# Patient Record
Sex: FEMALE | Race: WHITE
[De-identification: names, ages, dates, MRNs, and addresses within clinical notes are randomized per-mention and may not be internally consistent; named-entity substitution may affect disease eponyms.]

---
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
## 2021-02-08 ENCOUNTER — HOSPITAL ENCOUNTER (OUTPATIENT)
Dept: HOSPITAL 47 - FBPOP | Age: 21
Discharge: HOME | End: 2021-02-08
Attending: OBSTETRICS & GYNECOLOGY
Payer: COMMERCIAL

## 2021-02-08 VITALS
HEART RATE: 89 BPM | TEMPERATURE: 97.4 F | SYSTOLIC BLOOD PRESSURE: 118 MMHG | DIASTOLIC BLOOD PRESSURE: 73 MMHG | RESPIRATION RATE: 17 BRPM

## 2021-02-08 DIAGNOSIS — O46.93: Primary | ICD-10-CM

## 2021-02-08 DIAGNOSIS — Z3A.34: ICD-10-CM

## 2021-02-08 PROCEDURE — 99213 OFFICE O/P EST LOW 20 MIN: CPT

## 2021-02-08 PROCEDURE — 59025 FETAL NON-STRESS TEST: CPT

## 2021-02-16 NOTE — P.MSEPDOC
Presenting Problems





- Arrival Data


Date of Arrival on Unit: 21


Time of Arrival on Unit: 14:55


Mode of Transport: Ambulatory





- Complaint


OB-Reason for Admission/Chief Complaint: Other


Comment: pt here with c/o bright red spotting and lower abd cramping that 

started around 1100 today after bearing down for bowel movment, pt denies 

intercourse, sterile spec performed with no vaginal bleeding noted or external 

hemorrhoids observed





Prenatal Medical History





- Pregnancy Information


: 1


Para: 0


Term: 0


: 0


Abortions: Spontaneous or Elective: 0


Number of Living Children: 0





- Gestational Age


Gestational Age by JESSICA (wks/days): 34 Weeks and 5 Days





- Prenatal History


Pregnancy Complications: Smoker





Review of Systems





- Review of Systems


Constitutional: No problems


Breast: No problems


ENT: No problems


Cardiovascular: No problems


Respiratory: No problems


Gastrointestinal: No problems


Genitourinary: No problems


Musculoskeletal: No problems


Neurological: No problems


Skin: No problems





Vital Signs





- Temperature


Temperature: 97.4 F


Temperature Source: Oral





- Pulse


  ** Right Brachial


Pulse Rate: 89


Pulse Assessment Method: Automatic Cuff





- Respirations


Respiratory Rate: 17


Oxygen Delivery Method: Room Air


O2 Sat by Pulse Oximetry: 98





- Blood Pressure


  ** Right Arm


Blood Pressure: 118/73


Blood Pressure Mean: 88


Blood Pressure Source: Automatic Cuff





Medical Screen Scoring (Pre)





- Cervical Exam


Dilation: 0 cm = 0


Membranes: Intact





- Uterine Contractions


Frequency: > 5 minutes apart = 1


Duration: > 40 seconds = 2


Intensity: N/A





- Maternal Vital Signs


Maternal Temperature: N/A


Maternal Blood Pressure: N/A


Signs of Preeclampsia: N/A


Maternal Respirations: N/A





- Maternal Trauma


Maternal Trauma: N/A





- Fetal Assessment - Baby A


Baseline FHR: 130


Fetal Heart Rate - NICHD Category: Category I (Normal) = 0


NST: Reactive


Fetal Position: N/A


Fetal Station: N/A





- Total Score - Baby A


Total Score - Baby A: 3





- Total Score - Baby B


Total Score - Baby B: 3





- Total Score - Baby C


Total Score - Baby C: 3





- Level of Risk - Baby A


Level of Risk - Baby A: Low (0-5)





- Level of Risk - Baby B


Level of Risk - Baby B: Low (0-5)





- Level of Risk - Baby C


Level of Risk - Baby C: Low (0-5)





Physician Notification (Pre)





- Physician Notified


Physician Notified Date: 21


Physician Notified Time: 15:31


New Order Received: Yes (dc home)





- Notification Comment


Comment: pt ok to dc home, pt to be on pelvic rest and must follow up with dr kennedy tomorrow to be seen in the office this week





Disposition





- Disposition


OB Disposition: Discharge to home, Written follow up instructions reviewed


Discharge Date: 21


Discharge Time: 15:45


I agree with the RN Medical Screening Exam: Yes


Case reviewed; plan agreed upon as documented in EMR&OBIX.: Yes


Diagnosis: vaginal bleeding in pregnancy

## 2021-02-22 ENCOUNTER — HOSPITAL ENCOUNTER (INPATIENT)
Dept: HOSPITAL 47 - 4FBP | Age: 21
LOS: 1 days | Discharge: HOME | End: 2021-02-23
Attending: OBSTETRICS & GYNECOLOGY | Admitting: OBSTETRICS & GYNECOLOGY
Payer: COMMERCIAL

## 2021-02-22 DIAGNOSIS — L50.9: ICD-10-CM

## 2021-02-22 DIAGNOSIS — F17.290: ICD-10-CM

## 2021-02-22 DIAGNOSIS — F31.9: ICD-10-CM

## 2021-02-22 DIAGNOSIS — F60.3: ICD-10-CM

## 2021-02-22 DIAGNOSIS — F41.9: ICD-10-CM

## 2021-02-22 DIAGNOSIS — Z3A.36: ICD-10-CM

## 2021-02-22 DIAGNOSIS — K83.1: ICD-10-CM

## 2021-02-22 LAB
ALT SERPL-CCNC: 589 U/L (ref 4–34)
AST SERPL-CCNC: 480 U/L (ref 14–36)
BASOPHILS # BLD AUTO: 0 K/UL (ref 0–0.2)
BASOPHILS NFR BLD AUTO: 0 %
EOSINOPHIL # BLD AUTO: 0.1 K/UL (ref 0–0.7)
EOSINOPHIL NFR BLD AUTO: 1 %
ERYTHROCYTE [DISTWIDTH] IN BLOOD BY AUTOMATED COUNT: 4.18 M/UL (ref 3.8–5.4)
ERYTHROCYTE [DISTWIDTH] IN BLOOD: 13.7 % (ref 11.5–15.5)
HCT VFR BLD AUTO: 33.1 % (ref 34–46)
HGB BLD-MCNC: 11.1 GM/DL (ref 11.4–16)
LDH SPEC-CCNC: 666 U/L (ref 313–618)
LYMPHOCYTES # SPEC AUTO: 1.5 K/UL (ref 1–4.8)
LYMPHOCYTES NFR SPEC AUTO: 23 %
MCH RBC QN AUTO: 26.5 PG (ref 25–35)
MCHC RBC AUTO-ENTMCNC: 33.5 G/DL (ref 31–37)
MCV RBC AUTO: 79.2 FL (ref 80–100)
MONOCYTES # BLD AUTO: 0.5 K/UL (ref 0–1)
MONOCYTES NFR BLD AUTO: 7 %
NEUTROPHILS # BLD AUTO: 4.4 K/UL (ref 1.3–7.7)
NEUTROPHILS NFR BLD AUTO: 67 %
PLATELET # BLD AUTO: 177 K/UL (ref 150–450)
WBC # BLD AUTO: 6.5 K/UL (ref 4–11)

## 2021-02-22 PROCEDURE — 85027 COMPLETE CBC AUTOMATED: CPT

## 2021-02-22 PROCEDURE — 0HQ9XZZ REPAIR PERINEUM SKIN, EXTERNAL APPROACH: ICD-10-PCS

## 2021-02-22 PROCEDURE — 86901 BLOOD TYPING SEROLOGIC RH(D): CPT

## 2021-02-22 PROCEDURE — 85025 COMPLETE CBC W/AUTO DIFF WBC: CPT

## 2021-02-22 PROCEDURE — 84460 ALANINE AMINO (ALT) (SGPT): CPT

## 2021-02-22 PROCEDURE — 10907ZC DRAINAGE OF AMNIOTIC FLUID, THERAPEUTIC FROM PRODUCTS OF CONCEPTION, VIA NATURAL OR ARTIFICIAL OPENING: ICD-10-PCS

## 2021-02-22 PROCEDURE — 3E0R3NZ INTRODUCTION OF ANALGESICS, HYPNOTICS, SEDATIVES INTO SPINAL CANAL, PERCUTANEOUS APPROACH: ICD-10-PCS

## 2021-02-22 PROCEDURE — 86900 BLOOD TYPING SEROLOGIC ABO: CPT

## 2021-02-22 PROCEDURE — 84450 TRANSFERASE (AST) (SGOT): CPT

## 2021-02-22 PROCEDURE — 83615 LACTATE (LD) (LDH) ENZYME: CPT

## 2021-02-22 PROCEDURE — 86850 RBC ANTIBODY SCREEN: CPT

## 2021-02-22 PROCEDURE — 3E033VJ INTRODUCTION OF OTHER HORMONE INTO PERIPHERAL VEIN, PERCUTANEOUS APPROACH: ICD-10-PCS

## 2021-02-22 PROCEDURE — 00HU33Z INSERTION OF INFUSION DEVICE INTO SPINAL CANAL, PERCUTANEOUS APPROACH: ICD-10-PCS

## 2021-02-22 PROCEDURE — 88307 TISSUE EXAM BY PATHOLOGIST: CPT

## 2021-02-22 RX ADMIN — POTASSIUM CHLORIDE SCH MLS/HR: 14.9 INJECTION, SOLUTION INTRAVENOUS at 22:24

## 2021-02-22 RX ADMIN — POTASSIUM CHLORIDE SCH MLS/HR: 14.9 INJECTION, SOLUTION INTRAVENOUS at 14:10

## 2021-02-22 RX ADMIN — POTASSIUM CHLORIDE SCH MLS/HR: 14.9 INJECTION, SOLUTION INTRAVENOUS at 17:01

## 2021-02-22 NOTE — P.PROBDLV
Vaginal Delivery Note





- .


Vaginal Delivery Note: 


This is a 20 yo  at 36 5/7 weeks that presents for Induction of labor 

secondary to cholestasis of pregnancy.  patient c/o generalized itching, bile 

acids along with LFTs were drawn and noted to be significantly elevated.  she 

was admitted to labor and delivery,  and pitocin induction of labor was begun 

per hospital protocol.  she underwent amniotomy and clear fluid was obtained.  

she progressed in labor and became uncomfortable.  she requested epidural 

placement,  epidural was placed by anesthesia.  she progressed to complete and 

began pushing, with excellent maternal effort she had a normal spontaneous 

vaginal delivery of a viable female infant at 20:59, weight of 7-9, apgars of 9-

9 at one and five minutes respectively.  After a 2 minute delay the umbilical 

cord was clamped and cut.  the infant was placed on the maternal abdomen.  The 

placenta was delivered spontaneous intact with a 3 vessel cord being noted.    

On inspection of the vaginal vault b/l lacerations are noted.  a small figure of

8 was preformed on the right was done to obtain hemostasis.  


uterus was firm and below the umbilicus,  EBL 200cc.  


all counts noted to be correct x 2. 


Patient and infant tolerated delivery well.

## 2021-02-22 NOTE — P.HPOB
History of Present Illness


H&P Date: 21


Chief Complaint: IUP at 36-5/7 weeks, cholestasis of pregnancy





Sustain a 21-year-old  at 36-5/7 weeks that presents to labor and delivery 

after routine OB visit.  Patient was seen previously complaining of generalized 

urticaria, liver function tests and bile acids were drawn.  Liver function tests

were resulted in significantly elevated along with an elevated bile acid of 20. 

Patient notes good fetal movement.  Otherwise patient's prenatal care has been 

uncomplicated.  Patient is a history of a normal spontaneous vaginal delivery 

around 38 weeks.


Patient denies loss of fluid or vaginal voiding.  She does note good fetal 

movement and occasional contractions.








Review of Systems


Constitutional: Denies chills, Denies fatigue, Denies fever


Ears, nose, mouth and throat: Denies headache


Cardiovascular: Reports leg edema


Respiratory: Denies dyspnea


Gastrointestinal: Denies constipation, Denies diarrhea, Denies nausea, Denies 

vomiting


Genitourinary: Reports pregnant


Integumentary: Reports as per HPI





Past Medical History


Past Medical History: No Reported History


Additional Past Medical History / Comment(s): borderline personality disorder


History of Any Multi-Drug Resistant Organisms: None Reported


Past Surgical History: Tonsillectomy


Additional Past Surgical History / Comment(s): thyroid nodule removed, ganglion 

cyst removed


Past Anesthesia/Blood Transfusion Reactions: No Reported Reaction


Past Psychological History: Anxiety, Bipolar, Depression


Additional Psychological History / Comment(s): borderline personality disorder


Smoking Status: Vaper


Past Alcohol Use History: None Reported


Additional Past Alcohol Use History / Comment(s): social smoker


Past Drug Use History: Marijuana


Additional Drug Use History / Comment(s): rare





- Past Family History


  ** Mother


Family Medical History: No Reported History





Medications and Allergies


                                Home Medications











 Medication  Instructions  Recorded  Confirmed  Type


 


No Known Home Medications  10/18/19 02/22/21 History








                                    Allergies











Allergy/AdvReac Type Severity Reaction Status Date / Time


 


No Known Allergies Allergy   Verified 21 13:58














Exam


Osteopathic Statement: *.  No significant issues noted on an osteopathic 

structural exam other than those noted in the History and Physical/Consult.


                                   Vital Signs











  Temp Pulse Resp Pulse Ox


 


 21 14:40  96.2 F L  95  18  98








                                Intake and Output











 21





 06:59 14:59 22:59


 


Other:   


 


  # Voids  1 


 


  Weight  102.058 kg 














Targeted physical exam is performed in this date and generalist a well-nourished

well-developed pregnant female in no acute distress, breathing is noted to be 

nonlabored, heart has regular rhythm, abdomen is gravid and appropriate for 

gestational age and cervical exam she is 2/50/-3 station amniotomy is performed 

and clear fluid was obtained.  Category 1 heart tones are appreciated with 

irregular contractions.





Results


Result Diagrams: 


                                 21 14:36





                  Abnormal Lab Results - Last 24 Hours (Table)











  21 Range/Units





  14:36 14:36 


 


Hgb  11.1 L   (11.4-16.0)  gm/dL


 


Hct  33.1 L   (34.0-46.0)  %


 


MCV  79.2 L   (80.0-100.0)  fL


 


AST   480 H  (14-36)  U/L


 


ALT   589 H  (4-34)  U/L


 


Lactate Dehydrogenase   666 H  (313-618)  U/L














Assessment and Plan


(1) 36 to 37 weeks gestation of pregnancy


Current Visit: Yes   Status: Acute   Code(s): VTK3280 -    SNOMED Code(s): 

533578050


   





(2) Cholestasis during pregnancy


Current Visit: Yes   Status: Acute   Code(s): O26.619 - LIVER AND BILIARY TRACT 

DISORD IN PREGNANCY, UNSP TRIMESTER; K83.1 - OBSTRUCTION OF BILE DUCT   SNOMED 

Code(s): 173401376


   


Plan: 





This 21-year-old  at 36-5/7 weeks was admitted to labor and delivery for 

induction of labor secondary to cholestasis.  Liver function tests are ordered 

in addition to routine admission labs.  Patient is offered epidural once 

uncomfortable.  Pitocin is begun per hospital protocol.  Anticipate spontaneous 

vaginal delivery.

## 2021-02-23 VITALS
TEMPERATURE: 98.4 F | RESPIRATION RATE: 16 BRPM | DIASTOLIC BLOOD PRESSURE: 73 MMHG | HEART RATE: 72 BPM | SYSTOLIC BLOOD PRESSURE: 127 MMHG

## 2021-02-23 LAB
ALT SERPL-CCNC: 475 U/L (ref 4–34)
AST SERPL-CCNC: 349 U/L (ref 14–36)
ERYTHROCYTE [DISTWIDTH] IN BLOOD BY AUTOMATED COUNT: 3.88 M/UL (ref 3.8–5.4)
ERYTHROCYTE [DISTWIDTH] IN BLOOD: 14 % (ref 11.5–15.5)
HCT VFR BLD AUTO: 31 % (ref 34–46)
HGB BLD-MCNC: 10.5 GM/DL (ref 11.4–16)
MCH RBC QN AUTO: 27 PG (ref 25–35)
MCHC RBC AUTO-ENTMCNC: 33.8 G/DL (ref 31–37)
MCV RBC AUTO: 79.7 FL (ref 80–100)
PLATELET # BLD AUTO: 146 K/UL (ref 150–450)
WBC # BLD AUTO: 11.5 K/UL (ref 4–11)

## 2021-02-23 RX ADMIN — IBUPROFEN PRN MG: 600 TABLET ORAL at 16:24

## 2021-02-23 RX ADMIN — DOCUSATE SODIUM AND SENNOSIDES SCH EACH: 50; 8.6 TABLET ORAL at 19:23

## 2021-02-23 RX ADMIN — IBUPROFEN PRN MG: 600 TABLET ORAL at 04:01

## 2021-02-23 RX ADMIN — DOCUSATE SODIUM AND SENNOSIDES SCH EACH: 50; 8.6 TABLET ORAL at 08:49

## 2021-02-23 NOTE — P.DS
Providers


Date of admission: 


02/22/21 13:36





Expected date of discharge: 02/23/21


Attending physician: 


Georgia Monet





Primary care physician: 


Georgia Monet








- Discharge Diagnosis(es)


(1) 36 to 37 weeks gestation of pregnancy


Current Visit: Yes   Status: Acute   





(2) Cholestasis during pregnancy


Current Visit: Yes   Status: Acute   





(3) Status post vaginal delivery


Current Visit: Yes   Status: Acute   


Hospital Course: 





prenatal visit.  She had had labs done at the prior visit for complaints of 

urticaria.  On review of the labs liver function tests were noted to be 

significantly elevated along with bilateral since being elevated at 20.  Patient

was then admitted to labor and delivery for induction of labor secondary to 

cholestasis of pregnancy.  Patient was admitted and Pitocin induction of labor 

was begun.  Amniotomy was performed and clear fluid was obtained.  Patient did 

become uncomfortable and requested epidural placement.  Epidural was placed 

without difficulty by the anesthesia department.  She did get minimal relief of 

her contractions from the epidural.  Therefore epidural was replaced by 

anesthesia where once again she did get relief but was moving along in labor and

noted to be complete soon afterwards.  Patient began pushing and had a normal 

spontaneous vaginal delivery of a viable female infant at 2089, weight of 7 lbs.

9 oz. with Apgars of 99 at one and 5 minutes respectively.  Patient did sustain 

a small.  Urethral/labial laceration which was repaired with a figure-of-eight 

suture of 0 Vicryl. 


Patient did tolerate delivery well, she is breast-feeding.  Her postpartum 

course has been uneventful.  On this postpartum day #1 she is in bleeding and 

voiding without difficulty.  She is tolerating a regular diet without nausea or 

vomiting.  She states her pain is well-controlled.  She did have repeat liver 

function test done this morning, AST went from 480 to , ALT decreased from 589 

to 475, hemoglobin stable going from 11-10.5.  


she does look well this morning and would like discharge home.


Patient Condition at Discharge: Good





Plan - Discharge Summary


New Discharge Prescriptions: 


No Action


   No Known Home Medications 


Discharge Medication List





No Known Home Medications  10/18/19 [History]








Follow up Appointment(s)/Referral(s): 


Georgia Monet DO [Primary Care Provider] - 1 Week


Patient Instructions/Handouts:  Vaginal Delivery (DC), Vaginal Delivery (GEN)


Activity/Diet/Wound Care/Special Instructions: 


patient is to follow-up in 1 week for repeat liver function tests.  Patient is 

to call the office should any thing arise that is concerning to her prior to 

this appointment.  She is to call with fever, chills, abnormal vaginal bleeding,

increase in urticaria symptoms.  Over-the-counter ibuprofen as needed fo pain.


Discharge Disposition: HOME SELF-CARE

## 2021-04-06 ENCOUNTER — HOSPITAL ENCOUNTER (EMERGENCY)
Dept: HOSPITAL 47 - EC | Age: 21
Discharge: HOME | End: 2021-04-06
Payer: COMMERCIAL

## 2021-04-06 VITALS
TEMPERATURE: 98.7 F | HEART RATE: 92 BPM | DIASTOLIC BLOOD PRESSURE: 67 MMHG | SYSTOLIC BLOOD PRESSURE: 114 MMHG | RESPIRATION RATE: 16 BRPM

## 2021-04-06 DIAGNOSIS — Z00.00: Primary | ICD-10-CM

## 2021-04-06 DIAGNOSIS — F17.290: ICD-10-CM

## 2021-04-06 DIAGNOSIS — Z20.822: ICD-10-CM

## 2021-04-06 PROCEDURE — 99284 EMERGENCY DEPT VISIT MOD MDM: CPT

## 2021-04-06 PROCEDURE — 87635 SARS-COV-2 COVID-19 AMP PRB: CPT

## 2021-04-06 NOTE — ED
URI HPI





- General


Chief Complaint: Headache


Stated Complaint: covid test


Time Seen by Provider: 04/06/21 03:47


Source: patient


Mode of arrival: ambulatory


Limitations: no limitations





- Related Data


                                Home Medications











 Medication  Instructions  Recorded  Confirmed


 


No Known Home Medications  10/18/19 02/22/21











                                    Allergies











Allergy/AdvReac Type Severity Reaction Status Date / Time


 


No Known Allergies Allergy   Verified 04/06/21 00:19














Review of Systems


ROS Statement: 


Those systems with pertinent positive or pertinent negative responses have been 

documented in the HPI.





ROS Other: All systems not noted in ROS Statement are negative.





Past Medical History


Past Medical History: No Reported History


Additional Past Medical History / Comment(s): borderline personality disorder


History of Any Multi-Drug Resistant Organisms: None Reported


Past Surgical History: Tonsillectomy


Additional Past Surgical History / Comment(s): thyroid nodule removed, ganglion 

cyst removed


Past Anesthesia/Blood Transfusion Reactions: No Reported Reaction


Past Psychological History: Anxiety, Bipolar, Depression


Smoking Status: Vaper


Past Alcohol Use History: None Reported


Past Drug Use History: Marijuana





- Past Family History


  ** Mother


Family Medical History: No Reported History





General Exam


Limitations: no limitations





Course





                                   Vital Signs











  04/06/21





  00:19


 


Temperature 98.7 F


 


Pulse Rate 92


 


Respiratory 16





Rate 


 


Blood Pressure 114/67


 


O2 Sat by Pulse 98





Oximetry 














Medical Decision Making





- Lab Data





                                   Lab Results











  04/06/21 Range/Units





  00:26 


 


Coronavirus (PCR)  Not Detected  (Not Detectd)  














Disposition


Clinical Impression: 


 Normal exam





Narrative: 





COVID exposure


Disposition: HOME SELF-CARE


Condition: Good


Instructions (If sedation given, give patient instructions):  Normal Exam (ED)


Is patient prescribed a controlled substance at d/c from ED?: No


Referrals: 


None,Stated [Primary Care Provider] - 1-2 days

## 2021-05-07 ENCOUNTER — HOSPITAL ENCOUNTER (EMERGENCY)
Dept: HOSPITAL 47 - EC | Age: 21
Discharge: HOME | End: 2021-05-07
Payer: COMMERCIAL

## 2021-05-07 VITALS
SYSTOLIC BLOOD PRESSURE: 118 MMHG | RESPIRATION RATE: 18 BRPM | DIASTOLIC BLOOD PRESSURE: 68 MMHG | TEMPERATURE: 98 F | HEART RATE: 67 BPM

## 2021-05-07 DIAGNOSIS — F17.290: ICD-10-CM

## 2021-05-07 DIAGNOSIS — F41.9: ICD-10-CM

## 2021-05-07 DIAGNOSIS — R51.9: ICD-10-CM

## 2021-05-07 DIAGNOSIS — F32.9: ICD-10-CM

## 2021-05-07 DIAGNOSIS — R11.0: Primary | ICD-10-CM

## 2021-05-07 DIAGNOSIS — F12.90: ICD-10-CM

## 2021-05-07 DIAGNOSIS — Z20.822: ICD-10-CM

## 2021-05-07 PROCEDURE — 87635 SARS-COV-2 COVID-19 AMP PRB: CPT

## 2021-05-07 PROCEDURE — 99283 EMERGENCY DEPT VISIT LOW MDM: CPT

## 2021-05-07 NOTE — ED
General Adult HPI





- General


Chief complaint: Abdominal Pain


Stated complaint: Covid test


Time Seen by Provider: 05/07/21 18:21


Source: patient, RN notes reviewed


Mode of arrival: ambulatory


Limitations: no limitations





- History of Present Illness


Initial comments: 





21-year-old female presents to the emergency room for a chief complaint of 

wanting a corona virus test.  Patient reports her child tested positive today 

and she would like to be tested.  States she has had slight nausea and headaches

but otherwise denies any symptoms.  Denies fevers.  Denies shortness of breath 

or chest pain.  Denies abdominal pain.Patient has no other complaints at this 

time including shortness of breath, chest pain, abdominal pain, nausea or 

vomiting, headache, or visual changes.





- Related Data


                                Home Medications











 Medication  Instructions  Recorded  Confirmed


 


No Known Home Medications  10/18/19 02/22/21











                                    Allergies











Allergy/AdvReac Type Severity Reaction Status Date / Time


 


No Known Allergies Allergy   Verified 04/06/21 00:19














Review of Systems


ROS Statement: 


Those systems with pertinent positive or pertinent negative responses have been 

documented in the HPI.





ROS Other: All systems not noted in ROS Statement are negative.





Past Medical History


Past Medical History: No Reported History


Additional Past Medical History / Comment(s): borderline personality disorder


History of Any Multi-Drug Resistant Organisms: None Reported


Past Surgical History: Tonsillectomy


Additional Past Surgical History / Comment(s): thyroid nodule removed, ganglion 

cyst removed


Past Anesthesia/Blood Transfusion Reactions: No Reported Reaction


Past Psychological History: Anxiety, Bipolar, Depression


Smoking Status: Vaper


Past Alcohol Use History: None Reported


Past Drug Use History: Marijuana





- Past Family History


  ** Mother


Family Medical History: No Reported History





General Exam


Limitations: no limitations


General appearance: alert, in no apparent distress


Head exam: Present: atraumatic, normocephalic, normal inspection


Eye exam: Present: normal appearance, PERRL, EOMI.  Absent: scleral icterus, 

conjunctival injection, periorbital swelling


ENT exam: Present: normal exam, mucous membranes moist


Neck exam: Present: normal inspection.  Absent: tenderness, meningismus, 

lymphadenopathy


Respiratory exam: Present: normal lung sounds bilaterally.  Absent: respiratory 

distress, wheezes, rales, rhonchi, stridor


Cardiovascular Exam: Present: regular rate, normal rhythm, normal heart sounds. 

Absent: systolic murmur, diastolic murmur, rubs, gallop, clicks


GI/Abdominal exam: Present: soft, normal bowel sounds.  Absent: distended, 

tenderness, guarding, rebound, rigid


Neurological exam: Present: alert





Course


                                   Vital Signs











  05/07/21





  18:16


 


Temperature 97.8 F


 


Pulse Rate 86


 


Respiratory 16





Rate 


 


Blood Pressure 119/71


 


O2 Sat by Pulse 98





Oximetry 














Medical Decision Making





- Medical Decision Making





Vitals are stable.  Patient is well-appearing.  Coronavirus is negative.  

However patient has been exposed and therefore should quarantine the 10-14 days.

 I also recommend she repeat her tested the next few days.  If she has any 

worsening symptoms she will return to the emergency room.





- Lab Data


                                   Lab Results











  05/07/21 Range/Units





  18:25 


 


Coronavirus (PCR)  Not Detected  (Not Detectd)  














Disposition


Clinical Impression: 


 Lab test negative for COVID-19 virus





Disposition: HOME SELF-CARE


Condition: Good


Instructions (If sedation given, give patient instructions):  Coronavirus 

Disease 2019 (COVID-19)


Additional Instructions: 


You tested negative for COVID however you have been exposed to COVID so you must

quarantine for 10-14 days.  We recommend that you repeat a COVID test in the 

next few days as well.  If you develop any shortness of breath return to the 

emergency room.  Otherwise take Tylenol for fever and over-the-counter vitamins.


Is patient prescribed a controlled substance at d/c from ED?: No


Referrals: 


ELVIE Rodriguez III, MD [Primary Care Provider] - 1-2 days


Time of Disposition: 19:25

## 2021-05-20 ENCOUNTER — HOSPITAL ENCOUNTER (INPATIENT)
Dept: HOSPITAL 47 - EC | Age: 21
LOS: 2 days | Discharge: PSYCHIATRIC OR SUBSTANCE USE FACILITY | DRG: 918 | End: 2021-05-22
Attending: INTERNAL MEDICINE | Admitting: INTERNAL MEDICINE
Payer: COMMERCIAL

## 2021-05-20 VITALS — BODY MASS INDEX: 30.7 KG/M2

## 2021-05-20 DIAGNOSIS — F15.10: ICD-10-CM

## 2021-05-20 DIAGNOSIS — H57.04: ICD-10-CM

## 2021-05-20 DIAGNOSIS — F41.9: ICD-10-CM

## 2021-05-20 DIAGNOSIS — F31.9: ICD-10-CM

## 2021-05-20 DIAGNOSIS — D72.829: ICD-10-CM

## 2021-05-20 DIAGNOSIS — F17.200: ICD-10-CM

## 2021-05-20 DIAGNOSIS — F60.3: ICD-10-CM

## 2021-05-20 DIAGNOSIS — E87.2: ICD-10-CM

## 2021-05-20 DIAGNOSIS — T44.3X1A: ICD-10-CM

## 2021-05-20 DIAGNOSIS — E86.0: ICD-10-CM

## 2021-05-20 DIAGNOSIS — F14.10: ICD-10-CM

## 2021-05-20 DIAGNOSIS — T43.621A: Primary | ICD-10-CM

## 2021-05-20 DIAGNOSIS — Z20.822: ICD-10-CM

## 2021-05-20 DIAGNOSIS — R45.851: ICD-10-CM

## 2021-05-20 DIAGNOSIS — Z91.5: ICD-10-CM

## 2021-05-20 DIAGNOSIS — F12.10: ICD-10-CM

## 2021-05-20 DIAGNOSIS — N17.9: ICD-10-CM

## 2021-05-20 LAB
ALBUMIN SERPL-MCNC: 5.2 G/DL (ref 3.5–5)
ALP SERPL-CCNC: 155 U/L (ref 38–126)
ALT SERPL-CCNC: 91 U/L (ref 4–34)
ANION GAP SERPL CALC-SCNC: 17 MMOL/L
APAP SPEC-MCNC: <10 UG/ML
AST SERPL-CCNC: 71 U/L (ref 14–36)
BASOPHILS # BLD AUTO: 0.1 K/UL (ref 0–0.2)
BASOPHILS NFR BLD AUTO: 0 %
BUN SERPL-SCNC: 6 MG/DL (ref 7–17)
CALCIUM SPEC-MCNC: 10.6 MG/DL (ref 8.4–10.2)
CHLORIDE SERPL-SCNC: 107 MMOL/L (ref 98–107)
CO2 SERPL-SCNC: 21 MMOL/L (ref 22–30)
EOSINOPHIL # BLD AUTO: 0.1 K/UL (ref 0–0.7)
EOSINOPHIL NFR BLD AUTO: 0 %
ERYTHROCYTE [DISTWIDTH] IN BLOOD BY AUTOMATED COUNT: 5.56 M/UL (ref 3.8–5.4)
ERYTHROCYTE [DISTWIDTH] IN BLOOD: 17.4 % (ref 11.5–15.5)
GLUCOSE SERPL-MCNC: 109 MG/DL (ref 74–99)
HCT VFR BLD AUTO: 44.5 % (ref 34–46)
HGB BLD-MCNC: 15.6 GM/DL (ref 11.4–16)
LYMPHOCYTES # SPEC AUTO: 1.7 K/UL (ref 1–4.8)
LYMPHOCYTES NFR SPEC AUTO: 12 %
MCH RBC QN AUTO: 28 PG (ref 25–35)
MCHC RBC AUTO-ENTMCNC: 35 G/DL (ref 31–37)
MCV RBC AUTO: 80 FL (ref 80–100)
MONOCYTES # BLD AUTO: 0.7 K/UL (ref 0–1)
MONOCYTES NFR BLD AUTO: 5 %
NEUTROPHILS # BLD AUTO: 11.4 K/UL (ref 1.3–7.7)
NEUTROPHILS NFR BLD AUTO: 81 %
PLATELET # BLD AUTO: 285 K/UL (ref 150–450)
POTASSIUM SERPL-SCNC: 4.4 MMOL/L (ref 3.5–5.1)
PROT SERPL-MCNC: 8.1 G/DL (ref 6.3–8.2)
SODIUM SERPL-SCNC: 145 MMOL/L (ref 137–145)
WBC # BLD AUTO: 14 K/UL (ref 4–11)

## 2021-05-20 PROCEDURE — 85025 COMPLETE CBC W/AUTO DIFF WBC: CPT

## 2021-05-20 PROCEDURE — 96361 HYDRATE IV INFUSION ADD-ON: CPT

## 2021-05-20 PROCEDURE — 36415 COLL VENOUS BLD VENIPUNCTURE: CPT

## 2021-05-20 PROCEDURE — 99285 EMERGENCY DEPT VISIT HI MDM: CPT

## 2021-05-20 PROCEDURE — 96376 TX/PRO/DX INJ SAME DRUG ADON: CPT

## 2021-05-20 PROCEDURE — 83605 ASSAY OF LACTIC ACID: CPT

## 2021-05-20 PROCEDURE — 87635 SARS-COV-2 COVID-19 AMP PRB: CPT

## 2021-05-20 PROCEDURE — 80306 DRUG TEST PRSMV INSTRMNT: CPT

## 2021-05-20 PROCEDURE — 80143 DRUG ASSAY ACETAMINOPHEN: CPT

## 2021-05-20 PROCEDURE — 96374 THER/PROPH/DIAG INJ IV PUSH: CPT

## 2021-05-20 PROCEDURE — 71045 X-RAY EXAM CHEST 1 VIEW: CPT

## 2021-05-20 PROCEDURE — 81025 URINE PREGNANCY TEST: CPT

## 2021-05-20 PROCEDURE — 80053 COMPREHEN METABOLIC PANEL: CPT

## 2021-05-20 PROCEDURE — 80048 BASIC METABOLIC PNL TOTAL CA: CPT

## 2021-05-20 PROCEDURE — 93005 ELECTROCARDIOGRAM TRACING: CPT

## 2021-05-20 RX ADMIN — CEFAZOLIN SCH MLS/HR: 330 INJECTION, POWDER, FOR SOLUTION INTRAMUSCULAR; INTRAVENOUS at 16:21

## 2021-05-20 NOTE — XR
EXAMINATION TYPE: XR chest 1V portable

 

DATE OF EXAM: 5/20/2021

 

COMPARISON: NONE

 

HISTORY: Tachycardia on and off since yesterday

 

FINDINGS: AP upright view of the chest is obtained. Low lung volumes. Multiple overlying leads. There
 is slight rotation to the right. No focal consolidation, pneumothorax or pleural effusion.

 

IMPRESSION:

1. No acute pulmonary disease.

## 2021-05-20 NOTE — ED
General Adult HPI





- General


Chief complaint: Psychiatric Symptoms


Stated complaint: Mental Health


Time Seen by Provider: 05/20/21 11:31


Source: patient


Mode of arrival: ambulatory


Limitations: no limitations





- History of Present Illness


Initial comments: 


Dictation was produced using dragon dictation software. please excuse any 

grammatical, word or spelling errors. 





This patient was cared for during a federal and state declared state of 

emergency secondary to Covid 19





Chief Complaint: 21-year-old female past medical history bipolar disease 

presents to the emergency department for possible toxicity from street drugs





History of Present Illness: Patient is a 21-year-old female she is amnestic to 

events that transpired yesterday.  According to mother was at the bedside 

patient was with some other friends.  Mother reports that she thinks that she 

may have overdosed on street drugs.  Since yesterday she's been having shaking 

tremulousness.  Mother reports that she is concerned that perhaps the drugs that

she took yesterday were laced with other substances.  Patient has history of 

bipolar disease she takes bipolar medicines.








The ROS documented in this emergency department record has been reviewed and 

confirmed by me.  Those systems with pertinent positive or negative responses 

have been documented in the HPI.  All other systems are other negative and/or 

noncontributory.








PHYSICAL EXAM:


General Impression: Alert and oriented x3, not in acute distress


HEENT: Normocephalic atraumatic, extra-ocular movements intact, dilated pupils 

that light, mildly diaphoretic, shaking


Cardiovascular: Heart regular rate and rhythm


Chest: Able to complete full sentences, no retractions, no tachypnea


Abdomen: abdomen soft, non-tender, non-distended, no organomegaly


Musculoskeletal: Pulses present and equal in all extremities, no peripheral 

edema


Motor:  no focal deficits noted


Neurological: CN II-XII grossly intact, no focal motor or sensory deficits noted


Skin: Intact with no visualized rashes


Psych: Normal affect and mood





ED course: 21-year-old female presents with presentation consistent with 

anticholinergic versus sympathomimetic toxidrome.


Laboratory evaluation obtained.  Mild stress leukocytosis of 14.0.  Metabolic pa

is[place] shows anion gap of 17.  Lactic acidosis of 4.5.  Slight elevation of liver 

markers. coronavirus is negative.  Chest x-ray is nonacute.  Patient be admitted

for medical monitoring, supportive care, benzodiazepine administration.  Case 

discussed with Dr. Salas who is willing to accept patient's care.





EKG interpretation: Ventricular rate 145, sinus tachycardia, AR interval 126, 

QRS 78, . No AR prolongation, no QTC prolongation, no ST or T-wave 

changes noted.  








- Related Data


                                Home Medications











 Medication  Instructions  Recorded  Confirmed


 


ALPRAZolam [Xanax] 0.25 mg PO DAILY PRN 05/20/21 05/20/21


 


Citalopram Hydrobromide [CeleXA] See Taper PO DAILY 05/20/21 05/20/21


 


Etonogestrel/Ethinyl Estradiol 1 ring VG Q28D 05/20/21 05/20/21





[Eluryng Vaginal Ring]   


 


Lamictal Kit See Taper PO DAILY 05/20/21 05/20/21


 


Omeprazole 40 mg PO DAILY 05/20/21 05/20/21


 


hydrOXYzine HCL [Atarax] 50 mg PO Q6H PRN 05/20/21 05/20/21











                                    Allergies











Allergy/AdvReac Type Severity Reaction Status Date / Time


 


No Known Allergies Allergy   Verified 05/20/21 12:51














Review of Systems


ROS Statement: 


Those systems with pertinent positive or pertinent negative responses have been 

documented in the HPI.





ROS Other: All systems not noted in ROS Statement are negative.





Past Medical History


Past Medical History: No Reported History


Additional Past Medical History / Comment(s): borderline personality disorder


History of Any Multi-Drug Resistant Organisms: None Reported


Past Surgical History: Tonsillectomy


Additional Past Surgical History / Comment(s): thyroid nodule removed, ganglion 

cyst removed


Past Anesthesia/Blood Transfusion Reactions: No Reported Reaction


Past Psychological History: Anxiety, Bipolar, Depression


Smoking Status: Vaper


Past Alcohol Use History: None Reported


Past Drug Use History: Marijuana





- Past Family History


  ** Mother


Family Medical History: No Reported History





General Exam


Limitations: no limitations





Course


                                   Vital Signs











  05/20/21 05/20/21





  11:07 13:16


 


Temperature 97.8 F 


 


Pulse Rate 92 135 H


 


Respiratory 18 20





Rate  


 


Blood Pressure 99/75 118/78


 


O2 Sat by Pulse 97 100





Oximetry  














Medical Decision Making





- Lab Data


Result diagrams: 


                                 05/20/21 12:39





                                 05/20/21 12:39


                                   Lab Results











  05/20/21 05/20/21 05/20/21 Range/Units





  12:39 12:39 12:39 


 


WBC  14.0 H    (4.0-11.0)  k/uL


 


RBC  5.56 H    (3.80-5.40)  m/uL


 


Hgb  15.6    (11.4-16.0)  gm/dL


 


Hct  44.5    (34.0-46.0)  %


 


MCV  80.0    (80.0-100.0)  fL


 


MCH  28.0    (25.0-35.0)  pg


 


MCHC  35.0    (31.0-37.0)  g/dL


 


RDW  17.4 H    (11.5-15.5)  %


 


Plt Count  285    (150-450)  k/uL


 


MPV  8.7    


 


Neutrophils %  81    %


 


Lymphocytes %  12    %


 


Monocytes %  5    %


 


Eosinophils %  0    %


 


Basophils %  0    %


 


Neutrophils #  11.4 H    (1.3-7.7)  k/uL


 


Lymphocytes #  1.7    (1.0-4.8)  k/uL


 


Monocytes #  0.7    (0-1.0)  k/uL


 


Eosinophils #  0.1    (0-0.7)  k/uL


 


Basophils #  0.1    (0-0.2)  k/uL


 


Anisocytosis  Slight    


 


Microcytosis  Slight    


 


Sodium   145   (137-145)  mmol/L


 


Potassium   4.4   (3.5-5.1)  mmol/L


 


Chloride   107   ()  mmol/L


 


Carbon Dioxide   21 L   (22-30)  mmol/L


 


Anion Gap   17   mmol/L


 


BUN   6 L   (7-17)  mg/dL


 


Creatinine   1.10 H   (0.52-1.04)  mg/dL


 


Est GFR (CKD-EPI)AfAm   84   (>60 ml/min/1.73 sqM)  


 


Est GFR (CKD-EPI)NonAf   73   (>60 ml/min/1.73 sqM)  


 


Glucose   109 H   (74-99)  mg/dL


 


Plasma Lactic Acid [place]    4.5 H*  (0.7-2.0)  mmol/L


 


Calcium   10.6 H   (8.4-10.2)  mg/dL


 


Total Bilirubin   0.8   (0.2-1.3)  mg/dL


 


AST   71 H   (14-36)  U/L


 


ALT   91 H   (4-34)  U/L


 


Alkaline Phosphatase   155 H   ()  U/L


 


Total Protein   8.1   (6.3-8.2)  g/dL


 


Albumin   5.2 H   (3.5-5.0)  g/dL


 


Acetaminophen   <10.0   ug/mL


 


Coronavirus (PCR)     (Not Detectd)  














  05/20/21 Range/Units





  12:41 


 


WBC   (4.0-11.0)  k/uL


 


RBC   (3.80-5.40)  m/uL


 


Hgb   (11.4-16.0)  gm/dL


 


Hct   (34.0-46.0)  %


 


MCV   (80.0-100.0)  fL


 


MCH   (25.0-35.0)  pg


 


MCHC   (31.0-37.0)  g/dL


 


RDW   (11.5-15.5)  %


 


Plt Count   (150-450)  k/uL


 


MPV   


 


Neutrophils %   %


 


Lymphocytes %   %


 


Monocytes %   %


 


Eosinophils %   %


 


Basophils %   %


 


Neutrophils #   (1.3-7.7)  k/uL


 


Lymphocytes #   (1.0-4.8)  k/uL


 


Monocytes #   (0-1.0)  k/uL


 


Eosinophils #   (0-0.7)  k/uL


 


Basophils #   (0-0.2)  k/uL


 


Anisocytosis   


 


Microcytosis   


 


Sodium   (137-145)  mmol/L


 


Potassium   (3.5-5.1)  mmol/L


 


Chloride   ()  mmol/L


 


Carbon Dioxide   (22-30)  mmol/L


 


Anion Gap   mmol/L


 


BUN   (7-17)  mg/dL


 


Creatinine   (0.52-1.04)  mg/dL


 


Est GFR (CKD-EPI)AfAm   (>60 ml/min/1.73 sqM)  


 


Est GFR (CKD-EPI)NonAf   (>60 ml/min/1.73 sqM)  


 


Glucose   (74-99)  mg/dL


 


Plasma Lactic Acid [place]   (0.7-2.0)  mmol/L


 


Calcium   (8.4-10.2)  mg/dL


 


Total Bilirubin   (0.2-1.3)  mg/dL


 


AST   (14-36)  U/L


 


ALT   (4-34)  U/L


 


Alkaline Phosphatase   ()  U/L


 


Total Protein   (6.3-8.2)  g/dL


 


Albumin   (3.5-5.0)  g/dL


 


Acetaminophen   ug/mL


 


Coronavirus (PCR)  Not Detected  (Not Detectd)  














Critical Care Time


Critical Care Time: Yes


Total Critical Care Time: 33





Disposition


Clinical Impression: 


 Sympathomimetic adverse reaction, Anticholinergic drug overdose





Disposition: ADMITTED AS IP TO THIS [facility]


Condition: Critical


Referrals: 


ELVIE Rodriguez III, MD [Primary Care Provider] - 1-2 days

## 2021-05-20 NOTE — P.CN
Psychiatric Consult





- .


Consult date: 05/20/21


Consult:: 





05/20/21 14:35


IDENTIFYING DATA: This patient is a 21-year-old  female has a history 

of bipolar disorder.





REASON FOR REFERRAL: Psychiatry was consulted for bipolar disorder and drug 

intoxication.





HISTORY OF PRESENT ILLNESS: The patient presented to the hospital with 

intoxication from street drugs according to ER report.  Patient apparently could

not recall the events that occurred prior to her coming to the hospital.  Acco

rding to ER report patient's mother believes patient that overdosed on drugs.  

Patient was also continued of tremulousness since yesterday.  Patient had an 

elevated AST and ALT and also elevated WBCs and RBCs.  Patient was seen today at

the bedside and appeared to be alert however also appeared to be responding to 

internal stimuli's during the interview.  Patient was laughing and pointing at 

the wall and at the EKG monitor.  She was a poor historian.  She spoke about 

"seeing things" and feeling "not okay at all".  She was laughing to herself at 

times.  She also claimed to feeling "shaky" for the past 2 weeks.  She made 

bizarre statements and was fairly inappropriate.  She admitted to using cocaine 

and methamphetamine however did not state how much.  She was responding to 

internal stimuli and admitted to having racing thoughts.  She claims that she is

also "seeing people and more ".  She claims that she sleeps approximately 3-

6 hours a night.  She claims of a poor appetite.  At this time patient denies 

any suicidal or homical ideations, intent or plan.  She also admits to using 

nicotine products and also marijuana.





PAST PSYCHIATRIC HISTORY: Patient has a a history of bipolar disorder.  Patient 

is apparently on Xanax and Celexa and Ativan as needed.  She states that she was

admitted once in a psychiatric hospital however does not remember where or when.

 Patient denies any history of suicide attempts in the past.





PAST MEDICAL HISTORY: GERD. 





ALLERGIES: as per EMR. 





CHEMICAL DEPENDENCY HISTORY: as per HPI. 





FAMILY PSYCHIATRIC/SUBSTANCE USE HISTORY: Unable to obtain.





SOCIAL HISTORY: Unable to obtain.





MENTAL STATUS EXAM: 


General Appearance: Patient appears to be stated age is alert, responding to 

internal stimuli and difficult to redirect. Patient appears to have fair hygiene

and grooming wearing hospital gown with poor eye contact.


Behavior: Patient is calmly lying in bed without any agitated behavior.  

Responding to internal stimuli.  Inappropriate


Speech: Patient's speech is fluent and nonpressured. 


Mood/Affect: Patient reports their mood is "ok", affect is incongruent and 

inappropriate


Suicidality/Homicidality:  Patient denies having any suicidal or homicidal 

ideation intent or plan.  


Perceptions: Patient denies any auditory hallucinations however does admit to 

visual hallucinations.  


Though content/process: Poor historian, guarded/evasive.  Bizarre.  Loose 

associations.


Memory and concentration: AOX2, however does not know today's date. Cannot spell

"WORLD" backwards


Judgment and insight: poor





IMPRESSIONS: 


Polysubstance intoxication


Methamphetamine abuse


Cocaine abuse


Cannabis use disorder mild


Nicotine dependence


History of bipolar disorder





PLAN: 


-At this time patient DOES NOT meet criteria for inpatient psychiatric admission

however once patient is medically cleared and sober from her polysubstance 

intoxication then will re-evaluate for need for possible inpatient psych 

admission.


-Would recommend the following medication changes/additions: Zyprexa IM and by 

mouth when necessary for agitation/psychosis.  Schedule Zyprexa 5 mg daily at 

bedtime for psychosis/mood stabilization. Would recommend to hold off on Ativan 

or any other BZD when patient is recieving Zyprexa.


-Cannot leave AMA at this time. Patient will need a petition and certification 

if attempting to leave AMA.


-When medically stable, please inform psychiatry for continued evalation and 

follow up. 


-Spoke with ER doctor about patients case.


-Please contact with any questions.








05/20/21 14:51

## 2021-05-20 NOTE — P.HPIM
History of Present Illness


This is a pleasant 21-year-old female a came in with the shaking tremulousness 

agitation.  Patient was pleasant and did admit to using amphetamines/ecstasy 

which she says happened couple days ago and patient insulin for last couple d

ays.  Patient denied any fever chills patient had nausea vomiting dysuria.  

Patient had a urine drug screen which is positive for amphetamines and 

methamphetamines.  She does have a dry mouth.





Review of Systems








All other review of systems is negative except those mentioned above











Past Medical History


Past Medical History: No Reported History


Additional Past Medical History / Comment(s): borderline personality disorder


History of Any Multi-Drug Resistant Organisms: None Reported


Past Surgical History: Tonsillectomy


Additional Past Surgical History / Comment(s): thyroid nodule removed, ganglion 

cyst removed


Past Anesthesia/Blood Transfusion Reactions: No Reported Reaction


Past Psychological History: Anxiety, Bipolar, Depression


Smoking Status: Vaper


Past Alcohol Use History: None Reported


Past Drug Use History: Marijuana





- Past Family History


  ** Mother


Family Medical History: No Reported History





Medications and Allergies


                                Home Medications











 Medication  Instructions  Recorded  Confirmed  Type


 


ALPRAZolam [Xanax] 0.25 mg PO DAILY PRN 05/20/21 05/20/21 History


 


Citalopram Hydrobromide [CeleXA] See Taper PO DAILY 05/20/21 05/20/21 History


 


Etonogestrel/Ethinyl Estradiol 1 ring VG Q28D 05/20/21 05/20/21 History





[Eluryng Vaginal Ring]    


 


Lamictal Kit See Taper PO DAILY 05/20/21 05/20/21 History


 


Omeprazole 40 mg PO DAILY 05/20/21 05/20/21 History


 


hydrOXYzine HCL [Atarax] 50 mg PO Q6H PRN 05/20/21 05/20/21 History








                                    Allergies











Allergy/AdvReac Type Severity Reaction Status Date / Time


 


No Known Allergies Allergy   Verified 05/20/21 12:51














Physical Exam


Vitals: 


                                   Vital Signs











  Temp Pulse Resp BP Pulse Ox


 


 05/20/21 16:20   108 H  18  118/82  98


 


 05/20/21 13:16   135 H  20  118/78  100


 


 05/20/21 11:07  97.8 F  92  18  99/75  97








                                Intake and Output











 05/20/21 05/20/21 05/20/21





 06:59 14:59 22:59


 


Other:   


 


  Weight  72.575 kg 

















PHYSICAL EXAMINATION: 





GENERAL: The patient is alert and oriented x3, not in any acute distress. Well 

developed, well nourished.  She and extremely shaky hyperactive


HEENT: Pupils are round and equally reacting to light. EOMI. No scleral icterus.

 No conjunctival pallor. Normocephalic, atraumatic. No pharyngeal erythema. No 

thyromegaly. 


CARDIOVASCULAR: S1 and S2 present. No murmurs, rubs, or gallops.  Tachycardic


PULMONARY: Chest is clear to auscultation, no wheezing or crackles. 


ABDOMEN: Soft, nontender, nondistended, normoactive bowel sounds. No palpable 

organomegaly. 


MUSCULOSKELETAL: No joint swelling or deformity.


EXTREMITIES: No cyanosis, clubbing, or pedal edema. 


NEUROLOGICAL: Gross neurological examination did not reveal any focal deficits. 


SKIN: No rashes. 

















Results


CBC & Chem 7: 


                                 05/20/21 12:39





                                 05/20/21 12:39


Labs: 


                  Abnormal Lab Results - Last 24 Hours (Table)











  05/20/21 05/20/21 05/20/21 Range/Units





  12:39 12:39 12:39 


 


WBC  14.0 H    (4.0-11.0)  k/uL


 


RBC  5.56 H    (3.80-5.40)  m/uL


 


RDW  17.4 H    (11.5-15.5)  %


 


Neutrophils #  11.4 H    (1.3-7.7)  k/uL


 


Carbon Dioxide   21 L   (22-30)  mmol/L


 


BUN   6 L   (7-17)  mg/dL


 


Creatinine   1.10 H   (0.52-1.04)  mg/dL


 


Glucose   109 H   (74-99)  mg/dL


 


Plasma Lactic Acid [place]    4.5 H*  (0.7-2.0)  mmol/L


 


Calcium   10.6 H   (8.4-10.2)  mg/dL


 


AST   71 H   (14-36)  U/L


 


ALT   91 H   (4-34)  U/L


 


Alkaline Phosphatase   155 H   ()  U/L


 


Albumin   5.2 H   (3.5-5.0)  g/dL


 


Ur Amphetamines Screen     (NotDetected)  


 


U Methamphetamines Scrn     (NotDetected)  


 


U Benzodiazepines Scrn     (NotDetected)  


 


U Marijuana (THC) Screen     (NotDetected)  














  05/20/21 Range/Units





  16:03 


 


WBC   (4.0-11.0)  k/uL


 


RBC   (3.80-5.40)  m/uL


 


RDW   (11.5-15.5)  %


 


Neutrophils #   (1.3-7.7)  k/uL


 


Carbon Dioxide   (22-30)  mmol/L


 


BUN   (7-17)  mg/dL


 


Creatinine   (0.52-1.04)  mg/dL


 


Glucose   (74-99)  mg/dL


 


Plasma Lactic Acid [place]   (0.7-2.0)  mmol/L


 


Calcium   (8.4-10.2)  mg/dL


 


AST   (14-36)  U/L


 


ALT   (4-34)  U/L


 


Alkaline Phosphatase   ()  U/L


 


Albumin   (3.5-5.0)  g/dL


 


Ur Amphetamines Screen  Detected H  (NotDetected)  


 


U Methamphetamines Scrn  Detected H  (NotDetected)  


 


U Benzodiazepines Scrn  Detected H  (NotDetected)  


 


U Marijuana (THC) Screen  Detected H  (NotDetected)  














Assessment and Plan


Plan: 


-Overdose on abdomen is with abdomen is: Patient will be monitored patient was 

started on IV fluids continue with IV fluids next and-dehydration, patient will 

be on as needed Ativan.


acute renal failure secondary to amphetamine overdose IV fluids as mentioned 

above


-Bipolar disorder patient is on Zyprexa.


-DVT prophylaxis early ambulation

## 2021-05-21 LAB
ANION GAP SERPL CALC-SCNC: 7 MMOL/L
BUN SERPL-SCNC: 5 MG/DL (ref 7–17)
CALCIUM SPEC-MCNC: 9.2 MG/DL (ref 8.4–10.2)
CHLORIDE SERPL-SCNC: 110 MMOL/L (ref 98–107)
CO2 SERPL-SCNC: 23 MMOL/L (ref 22–30)
GLUCOSE SERPL-MCNC: 91 MG/DL (ref 74–99)
POTASSIUM SERPL-SCNC: 4.3 MMOL/L (ref 3.5–5.1)
SODIUM SERPL-SCNC: 140 MMOL/L (ref 137–145)

## 2021-05-21 RX ADMIN — CEFAZOLIN SCH: 330 INJECTION, POWDER, FOR SOLUTION INTRAMUSCULAR; INTRAVENOUS at 09:55

## 2021-05-21 RX ADMIN — CEFAZOLIN SCH: 330 INJECTION, POWDER, FOR SOLUTION INTRAMUSCULAR; INTRAVENOUS at 05:26

## 2021-05-21 RX ADMIN — PANTOPRAZOLE SODIUM SCH: 40 TABLET, DELAYED RELEASE ORAL at 09:55

## 2021-05-21 RX ADMIN — CEFAZOLIN SCH: 330 INJECTION, POWDER, FOR SOLUTION INTRAMUSCULAR; INTRAVENOUS at 11:41

## 2021-05-21 NOTE — P.PN
Progress Note - Text


Progress Note Date: 05/21/21





IDENTIFYING DATA: This patient is a 21-year-old  female has a history 

of bipolar disorder.





INTERVAL HISTORY:


Patient was seen resting in bed with her mother at bedside.  Currently at this 

time, the patient was unarousable but appears to be in no acute distress.  Much 

of the history was provided by her mother at bedside who states that the patient

has been engaging in heavy substance abuse prior to this admission.  She reports

that fairly recently, the patient began engaging in methamphetamine and cocaine 

use.  She reports that the patient was very altered prior to this admission.  

The patient does have a history of self-harm and history of suicide attempts as 

per mother, but has not reported any recent suicidal or homicidal ideation, 

intention, and/or plan.  The patient's mother hopes that the patient would be 

going to rehabilitation for substance abuse upon discharge.





MENTAL STATUS EXAM:


General Appearance: Patient is dressed in a hospital gown, is unarousable at 

this time, with poor eye contact.


Behavior: Patient is calmly lying down in bed without any agitated behavior.


Speech: Unable to assess


Mood/Affect: Unable to assess 


Suicidality/Homicidality:  Unable to assess


Perceptions: Unable to assess  


Though content/process: Unable to assess


Memory and concentration: Unable to assess


Judgment and insight: Unable to assess





                                   Vital Signs











Temp  97.8 F   05/21/21 03:50


 


Pulse  81   05/21/21 08:00


 


Resp  16   05/21/21 08:00


 


BP  115/65   05/21/21 03:50


 


Pulse Ox  97   05/21/21 08:00








                                 Intake & Output











 05/20/21 05/21/21 05/21/21





 18:59 06:59 18:59


 


Intake Total 365  0


 


Balance 365  0


 


Weight 72.575 kg 94.5 kg 


 


Intake:   


 


    


 


    Sodium Chloride 0.9% 1, 125  





    000 ml @ 125 mls/hr IV .   





    Q8H [facility] Rx#:[id number]   


 


  Oral 240  0


 


Other:   


 


  Voiding Method  Toilet 


 


  # Voids  3 














ASSESSMENT:


Polysubstance intoxication


Methamphetamine abuse


Cocaine abuse


Cannabis use disorder mild


Nicotine dependence


History of bipolar disorder





PLAN:


At this time patient DOES NOT meet criteria for inpatient psychiatric admission 

however once patient is medically cleared and sober from her polysubstance 

intoxication then will re-evaluate for need for possible inpatient psych 

admission.  The patient is unarousable at this time.  She is likely a better 

candidate for substance abuse treatment rather than inpatient psychiatric 

hospitalization.





-Would recommend the following medication changes/additions:


Continue Zyprexa IM or by mouth when necessary for agitation/psychosis.  


Continue Zyprexa 5 mg daily at bedtime for psychosis/mood stabilization. Would 

recommend to hold off on Ativan or any other BZD when patient is recieving 

Zyprexa.





-Cannot leave AMA at this time. Patient will need a petition and certification 

if attempting to leave AMA.





-When medically stable, please inform psychiatry for continued evaluation and 

follow up. 





-Please contact with any questions.





-Recommend social work consult to find the patient with the access number for 

rehabilitation.  Patient's mother expresses a desire for the patient to go to 

[place] for rehabilitation.

## 2021-05-21 NOTE — P.PN
Subjective





This is a pleasant 21-year-old female a came in with the shaking tremulousness 

agitation.  Patient was pleasant and did admit to using amphetamines/ecstasy 

which she says happened couple days ago and patient insulin for last couple 

days.  Patient denied any fever chills patient had nausea vomiting dysuria.  

Patient had a urine drug screen which is positive for amphetamines and 

methamphetamines.  She does have a dry mouth.





05/21/2021


Patient is a sleeping now.  Patient didn't sleep for last 3 days.  Discussed 

with the psychiatry.  Patient medically should be stable by tomorrow.  

Psychiatry will evaluate the patient again tomorrow and would does decide on 

placement whether to discharge home with the druggy rehabilitation follow-up or 

inpatient psychiatry admission.





Review of systems: Unable to obtain due to his clinical condition 





All inpatient medications were reviewed and appropriate changes in these 

medications as dictated in the interval history and assessment and plan.





Objective





- Vital Signs


Vital signs: 


                                   Vital Signs











Temp  97.8 F   05/21/21 03:50


 


Pulse  66   05/21/21 11:13


 


Resp  14   05/21/21 11:13


 


BP  113/68   05/21/21 11:13


 


Pulse Ox  97   05/21/21 11:13








                                 Intake & Output











 05/20/21 05/21/21 05/21/21





 18:59 06:59 18:59


 


Intake Total 365  0


 


Balance 365  0


 


Weight 72.575 kg 94.5 kg 


 


Intake:   


 


    


 


    Sodium Chloride 0.9% 1, 125  





    000 ml @ 125 mls/hr IV .   





    [facility] Rx#:[id number]   


 


  Oral 240  0


 


Other:   


 


  Voiding Method  Toilet Toilet


 


  # Voids  3 














- Exam








PHYSICAL EXAMINATION: 





GENERAL: Patient is sleeping, not in any acute distress. Well developed, well 

nourished. 


HEENT: Pupils are round and equally reacting to light. EOMI. No scleral icterus.

No conjunctival pallor. Normocephalic, atraumatic. No pharyngeal erythema. No 

thyromegaly. 


CARDIOVASCULAR: S1 and S2 present. No murmurs, rubs, or gallops. 


PULMONARY: Chest is clear to auscultation, no wheezing or crackles. 


ABDOMEN: Soft, nontender, nondistended, normoactive bowel sounds. No palpable 

organomegaly. 


MUSCULOSKELETAL: No joint swelling or deformity.


EXTREMITIES: No cyanosis, clubbing, or pedal edema. 


NEUROLOGICAL: Unable to assess patient is sleeping


SKIN: No rashes. 














- Labs


CBC & Chem 7: 


                                 05/20/21 12:39





                                 05/21/21 11:35


Labs: 


                  Abnormal Lab Results - Last 24 Hours (Table)











  05/20/21 05/20/21 05/20/21 Range/Units





  12:39 12:39 12:39 


 


WBC  14.0 H    (4.0-11.0)  k/uL


 


RBC  5.56 H    (3.80-5.40)  m/uL


 


RDW  17.4 H    (11.5-15.5)  %


 


Neutrophils #  11.4 H    (1.3-7.7)  k/uL


 


Chloride     ()  mmol/L


 


Carbon Dioxide   21 L   (22-30)  mmol/L


 


BUN   6 L   (7-17)  mg/dL


 


Creatinine   1.10 H   (0.52-1.04)  mg/dL


 


Glucose   109 H   (74-99)  mg/dL


 


Plasma Lactic Acid [place]    4.5 H*  (0.7-2.0)  mmol/L


 


Calcium   10.6 H   (8.4-10.2)  mg/dL


 


AST   71 H   (14-36)  U/L


 


ALT   91 H   (4-34)  U/L


 


Alkaline Phosphatase   155 H   ()  U/L


 


Albumin   5.2 H   (3.5-5.0)  g/dL


 


Ur Amphetamines Screen     (NotDetected)  


 


U Methamphetamines Scrn     (NotDetected)  


 


U Benzodiazepines Scrn     (NotDetected)  


 


U Marijuana (THC) Screen     (NotDetected)  














  05/20/21 05/21/21 Range/Units





  16:03 11:35 


 


WBC    (4.0-11.0)  k/uL


 


RBC    (3.80-5.40)  m/uL


 


RDW    (11.5-15.5)  %


 


Neutrophils #    (1.3-7.7)  k/uL


 


Chloride   110 H  ()  mmol/L


 


Carbon Dioxide    (22-30)  mmol/L


 


BUN   5 L  (7-17)  mg/dL


 


Creatinine    (0.52-1.04)  mg/dL


 


Glucose    (74-99)  mg/dL


 


Plasma Lactic Acid [place]    (0.7-2.0)  mmol/L


 


Calcium    (8.4-10.2)  mg/dL


 


AST    (14-36)  U/L


 


ALT    (4-34)  U/L


 


Alkaline Phosphatase    ()  U/L


 


Albumin    (3.5-5.0)  g/dL


 


Ur Amphetamines Screen  Detected H   (NotDetected)  


 


U Methamphetamines Scrn  Detected H   (NotDetected)  


 


U Benzodiazepines Scrn  Detected H   (NotDetected)  


 


U Marijuana (THC) Screen  Detected H   (NotDetected)  














Assessment and Plan


Plan: 


-Overdose on amphetamine/ecstasy: Patient agitation episodes and hyperactive 

episodes improved and patient is presently sleeping patient is expected to be 

normal by tomorrow morning or later today, patient will be on as needed Ativan.


acute renal failure secondary to amphetamine overdose , improved with IV fluids 

IV fluids were discontinued


- leukocytosis reactive


-Bipolar disorder patient is on Zyprexa.


-DVT prophylaxis early ambulation

## 2021-05-21 NOTE — CONS
CONSULTATION



DATE OF SERVICE:

05/21/2021



PURPOSE FOR CONSULTATION:

Evaluate for substance use issues, history of bipolar disorder and statements 
that the

patient said she would choose to die.



HISTORY OF PRESENTING ILLNESS:

I refer the reader to Dr. Sanchez's consultation note from earlier today. When
I saw

the patient, she was awake and alert.  Her mother was with her during the 
interview.

It is noted that the patient had just given birth February 22. There was concern
after

that about postpartum depression, which mother identified. From the patient's

standpoint, she acknowledged significant substance use issues.  She has been 
using

methamphetamines apparently since she gave birth.  She was off of drugs for some
period

of time during the pregnancy, though the history of that is not clear.  When she
was seen 

this morning by Dr Sanchez, she was drowsy and not clearly communicative.  As 
he indicated,

she did not meet criteria for in-patient psychiatric hospitalization.  As the 
morning progressed,

the patient made statements to nursing that she "wants to die and doesn't care 
if kids don't have

a mom."  As such, Dr. Salas requested psychiatric follow up.  It is noted

that the patient has a 19-month-old child and a 3-month-old child.  CPS is 
involved

because of drug use issues and apparently there are restrictions on the patient 
having

contact with her children until she receives substance use treatment.  The 
patient

seemed to indicate that she has had long-term problems with his substance abuse 
of

various substances.  She had a psychiatric admission to [facility] in 2016 for 
cutting

behavior.  She notes that her longest period of sobriety was 6 months, though 
she was

vague about when that occurred.  She also said that she was sober during the 
early

course of her pregnancy.  Current psychotropic medications that she has been 
prescribed

include Celexa 40 mg a day and Lamictal 100 mg a day.  It is also indicated in 
the

chart that she is prescribed Xanax 0.25 mg p.r.n. and Atarax 50 mg q.6 hours 
p.r.n.

The patient was vague about how consistent she has been in taking her 
medications.  It

is noted that the patient indicated that she is choosing to go to a substance 
treatment

program, though she apparently has some reluctance about [place], though 
she was

not clear why. Her mother was also very encouraging of her to consider a 
possible

psychiatric admission as an interim before going into a substance use program.  
The

patient seemed pretty reluctant to consider that.



It is noted that the patient gave a history suggestive of bipolar issues.  She 
says she

has episodes of veronica where she has decreased need for sleep.  She will get high

energy.  She may have excessive energy. She was not clear whether she gets these

periods relating to substance use, though said that when she gets into these 
states, it

is one of the things that drives her into using abusive substances. She said 
that she

will get these episodes that can go on for days; on the other hand, she also 
notes

times where she can become depressed where she gets withdrawn and has very 
little

motivation or drive.



MENTAL STATUS:

Patient was lying in bed.  She gave fair eye contact.  She was somewhat 
restless.  She

answered questions appropriately.  Her thoughts were clear and coherent.  It is

noteworthy that through the interview, there were different times that she and 
her

mother disagreed.  She could become somewhat intense and angry at her mother.  
She had

a couple episodes where she started crying. At other times she seemed a little 
more

withdrawn than anything else.  She was spontaneous and somewhat interactive. Her
affect

was intense and anxious, her mood dysphoric. She seemed significantly 
distressed.

There was no clear indication of thought disorder, though the patient suggested 
that

she has some intermittent auditory type experiences when she is using 
methamphetamines.

She voiced no thoughts of harm at the time that I interviewed her.  She said she
had

made statements about thinking she would be better off if she was not alive, 
though

said she does not have any intent or plan towards self-harm.  She said she wants
to be

discharged so she can go home for a few days to organize things prior to her 
entering a

treatment program.  Cognition was clear.



ASSESSMENT:

This 21-year-old female is diagnosed with substance dependence and acute 
substance

withdrawal along with mood disorder, possibly bipolar affective disorder. I 
indicated

to the patient that there would be enough criteria to admit her to the 
psychiatric

unit.  We discussed the potential of her coming on the unit, which could help in

stabilizing the medication she is on that is indicated to help with early 
withdrawal

symptoms.  We discussed that she could use that time to address some of the 
emotional

issues she seems to be struggling with and also to get set up for a transfer to 
a

substance abuse unit.  The patient at the time of the interview was stating that
her

preference is to go home.  The plan would be that she would be staying with her 
mother.

In the course of this discussion her mother was somewhat skeptical of this, with

concern that mother would not be able to provide 24/7 observation and there was 
concern

about her relapsing into substance use.  One of the main focus is from her 
mother's

viewpoint was that Protective Services were very clear that she would not be 
able to

have contact with her children until after she completes a substance use 
program.

Apparently the specifics of expectations of Protective Services are not clear at
this

time, though both the patient and her mother are working on making some contacts
with

some substance abuse programs. At this point I will just continue to follow in

anticipation that the patient may be discharged to home tomorrow.  I will 
increase

Zyprexa to 5 mg 3 times a day on a regular basis with the indication to help 
reduce

physiologic stress response relating to acute substance withdrawal.  I will 
follow up

tomorrow.





LAVELL / SHAHBAZ: [id number] / Job#: [id number]

VARUN

## 2021-05-22 ENCOUNTER — HOSPITAL ENCOUNTER (INPATIENT)
Dept: HOSPITAL 47 - 3MHU | Age: 21
LOS: 3 days | Discharge: ANOTHER HOSPITAL | DRG: 885 | End: 2021-05-25
Attending: PSYCHIATRY & NEUROLOGY | Admitting: PSYCHIATRY & NEUROLOGY
Payer: MEDICAID

## 2021-05-22 VITALS — HEART RATE: 118 BPM

## 2021-05-22 VITALS — RESPIRATION RATE: 18 BRPM | SYSTOLIC BLOOD PRESSURE: 107 MMHG | DIASTOLIC BLOOD PRESSURE: 88 MMHG

## 2021-05-22 VITALS — HEART RATE: 94 BPM | SYSTOLIC BLOOD PRESSURE: 108 MMHG | DIASTOLIC BLOOD PRESSURE: 62 MMHG | TEMPERATURE: 97.8 F

## 2021-05-22 VITALS — RESPIRATION RATE: 16 BRPM

## 2021-05-22 DIAGNOSIS — F13.10: ICD-10-CM

## 2021-05-22 DIAGNOSIS — F12.10: ICD-10-CM

## 2021-05-22 DIAGNOSIS — F19.20: ICD-10-CM

## 2021-05-22 DIAGNOSIS — F31.30: Primary | ICD-10-CM

## 2021-05-22 DIAGNOSIS — Z79.899: ICD-10-CM

## 2021-05-22 DIAGNOSIS — F41.9: ICD-10-CM

## 2021-05-22 DIAGNOSIS — F17.200: ICD-10-CM

## 2021-05-22 DIAGNOSIS — F15.10: ICD-10-CM

## 2021-05-22 DIAGNOSIS — G47.00: ICD-10-CM

## 2021-05-22 RX ADMIN — PANTOPRAZOLE SODIUM SCH MG: 40 TABLET, DELAYED RELEASE ORAL at 07:21

## 2021-05-22 NOTE — P.DS
Providers


Date of admission: 


05/20/21 14:17





Attending physician: 


Katlyn Salas





Consults: 





                                        





05/20/21 13:29


Consult Physician Routine 


   Consulting Provider: Salo Sanchez Reason/Comments: hx of bipolar


   Do you want consulting provider notified?: Yes











Primary care physician: 


ELVIE [facility] Course: 


This is a pleasant 21-year-old female a came in with the shaking tremulousness 

agitation.  Patient was pleasant and did admit to using amphetamines/ecstasy 

which she says happened couple days ago and patient insulin for last couple 

days.  Patient denied any fever chills patient had nausea vomiting dysuria.  

Patient had a urine drug screen which is positive for amphetamines and 

methamphetamines.  She does have a dry mouth.





05/21/2021


Patient is a sleeping now.  Patient didn't sleep for last 3 days.  Discussed 

with the psychiatry.  Patient medically should be stable by tomorrow.  

Psychiatry will evaluate the patient again tomorrow and would does decide on 

placement whether to discharge home with the druggy rehabilitation follow-up or 

inpatient psychiatry admission.





5/22/2021


Patient seen on follow-up resting comfortably in bed, she is calm and 

appropriate, answering questions.  Mother is visiting at bedside, sitter is also

present at bedside..  Patient is agreeable to go to inpatient psychiatric unit 

for further treatment.





Review of systems: 


HEENT: negative


Respiratory: NEGATIVE


Cardiovascular: negative


Gastrointestinal: negative


Genitourinary: Negative





All inpatient medications were reviewed and appropriate changes in these 

medications as dictated in the interval history and assessment and plan.








PHYSICAL EXAMINATION: 





GENERAL: Patient is sleeping, not in any acute distress. Well developed, well 

nourished. 


HEENT: Pupils are round and equally reacting to light. EOMI. No scleral icterus.

No conjunctival pallor. Normocephalic, atraumatic. No pharyngeal erythema. No 

thyromegaly. 


CARDIOVASCULAR: S1 and S2 present. No murmurs, rubs, or gallops. 


PULMONARY: Chest is clear to auscultation, no wheezing or crackles. 


ABDOMEN: Soft, nontender, nondistended, normoactive bowel sounds. No palpable 

organomegaly. 


MUSCULOSKELETAL: No joint swelling or deformity.


EXTREMITIES: No cyanosis, clubbing, or pedal edema. 


NEUROLOGICAL: Unable to assess patient is sleeping


SKIN: No rashes. 











Plan: 


-Overdose on amphetamine/ecstasy: Agitation episodes have improved he is calm 

and appropriate.  Is agreeable to go to inpatient psychiatric unit.  Medically 

stable for discharge/ transfer to psychiatric unit


-acute renal failure secondary to amphetamine overdose: Improved, fluids hep-

locked 


- leukocytosis reactive


-Bipolar disorder patient is on Zyprexa and Lamictal 


-DVT prophylaxis early ambulation











Patient Condition at Discharge: Critical





Plan - Discharge Summary


New Discharge Prescriptions: 


New


   OLANZapine [ZyPREXA] 5 mg PO TID  tab





Continue


   Etonogestrel/Ethinyl Estradiol [Eluryng Vaginal Ring] 1 ring VG Q28D


   Omeprazole 40 mg PO DAILY





Discontinued


   Lamictal Kit See Taper PO DAILY


   hydrOXYzine HCL [Atarax] 50 mg PO Q6H PRN


     PRN Reason: Anxiety


   Citalopram Hydrobromide [CeleXA] See Taper PO DAILY


   ALPRAZolam [Xanax] 0.25 mg PO DAILY PRN


     PRN Reason: Anxiety


Discharge Medication List





Etonogestrel/Ethinyl Estradiol [Eluryng Vaginal Ring] 1 ring VG Q28D 05/20/21 

[History]


Omeprazole 40 mg PO DAILY 05/20/21 [History]


OLANZapine [ZyPREXA] 5 mg PO TID  tab 05/22/21 [Rx]








Follow up Appointment(s)/Referral(s): 


ELVIE Rodriguez III, MD [Primary Care Provider] - 3 Days


Discharge Disposition: TRANSFER TO PSYCH HOSP/UNIT

## 2021-05-23 RX ADMIN — NICOTINE SCH: 14 PATCH, EXTENDED RELEASE TRANSDERMAL at 09:08

## 2021-05-23 NOTE — HP
HISTORY AND PHYSICAL



DATE OF SERVICE:

05/22/2021



IDENTIFYING DATA:

The patient is a 21-year-old female. She was living with her boyfriend and 2 children.

She was referred through the ED for evaluation.



CHIEF COMPLAINT:

The patient apparently had either overdosed or taken an excessive amount of street

drugs on the day prior to admission.  She was amnestic to events.  She was quite

tremulous.



HISTORY OF PRESENTING ILLNESS:

The patient and mother were together when I did the interview and both provided some of

the information regarding her history. The patient has been diagnosed with bipolar

disorder.  She also has significant substance use issues.  It is noted that she gave

birth to her second child February 22.  There was concern at that time for a postpartum

depression. Also after giving birth, the patient got involved in using

methamphetamines, which has been a long-term issue for her.  She has had long-term

substance abuse problems.  Apparently, at least early in the pregnancy, the patient got

off drugs, though it is not clear the extent of that. She apparently has been using

drugs fairly consistently since February 22.  She was home with her boyfriend and her 2

children, a 19-month-old and the 3-month-old. She had taken an excessive amount of

street drugs. Apparently there was concern that they may have been laced with other

drugs as well.  The details of this are not clear. On admission, the urine drug screen

was positive for amphetamines, methamphetamines, benzodiazepines and THC.  She

apparently had become quite somnolent.  Her boyfriend called CPS as part of responding

to her situation.  The issue with the CPS is not clear, though according to mother, CPS

is indicating that the patient needs to be in substance abuse treatment before she

could be back in contact with her children, let alone having custody.  It is noted that

the patient has had long-term substance use issues.  She had a psychiatric admission at

[facility] in 2016 for a cutting behavior.  She said that her longest period of

sobriety from any abusive substances was for about 6 months, though she was vague about

the details of that.  Currently, she has been prescribed Celexa 40 mg a day and

Lamictal 100 mg a day.  The chart indicated that she also has been prescribed Xanax

0.25 mg p.r.n. and Atarax 50 mg q.6 hours p.r.n. as home medications prior to her

coming into the hospital. The patient was vague as to how consistently she had been

taking medications.  Patient gave a history suggestive of some bipolar symptoms with

episodes of veronica where she would have decreased need for sleep, high energy, excessive

activity and elevated mood.  She was not able to be clear how much these episodes may

have been related to substance use issues.  She would note these episodes would go on

for a number of days and she then could get into periods of becoming depressed, where

she was withdrawn and had poor motivation and interest.  It is not clear what mental

health followup the patient has had. She was started on Zyprexa with the dose increased

yesterday to 5 mg 3 times a day with the indication for addressing physiologic stress

response relating to acute substance withdrawal.  The patient is admitted for further

evaluation.



SUBSTANCE USE ISSUES:

As above.



PAST MEDICAL HISTORY:

The patient has 2 children including that she is 3 months postpartum with her 2nd child

being born February 22, 2020.



FAMILY AND SOCIAL HISTORY:

No information available other than as above in HPI.



MENTAL STATUS EXAM:

The patient was moderately restless. She gave fairly good eye contact.  She answered

questions with brief responses.  Her thoughts were clear.  She did not say a lot.  She

was not too spontaneous, though she was somewhat interactive.  Her affect was blunted.

Her mood reserved.  She seemed somewhat distressed.  There was no indication for

thought disorder though she had been observed earlier in the course of her medical

admission to be responding to internal stimuli and had made statements about "seeing

people and more ."  She voiced no thoughts of harm at the time that I interviewed

her.  It is noteworthy that she had made the statements to nursing on the 21st that she

"wants to die and does not care if kids don't have a mom."  The patient is not

reporting any thoughts or impulses toward self-harm at the time of this interview.  On

cognitive exam, the patient did not make an effort to answer formal cognitive

questions.  She was oriented and alert.  She was able to provide information that was

consistent with what is documented in the medical record.  Insight is fair.  Judgment

poor.  Fund of knowledge average.



PHYSICAL EXAM:

As per Dr. Salas.



ASSESSMENT:

This 21-year-old female is diagnosed with substance dependence and mood disorder with

possible bipolar disorder.  She has had long-term issues with substance abuse.  She has

not been able to provide much information relating to social support issues or factors

that may have contributed to her current difficulties.



Strengths include her insights in regard to needing substance abuse treatment.



Weakness includes consistent and ongoing use of abusive substances.



DIAGNOSES:

1. Polysubstance dependence and acute substance withdrawal.

2. Bipolar affective disorder by history.

3. Three months postpartum.



RECOMMENDATIONS:

Patient will be admitted for comprehensive medical, psychiatric and psychosocial

evaluation. We will engage the patient in individual and group therapeutic activities.

I will continue the patient on Zyprexa 5 mg 3 times a day.  The aim of Zyprexa is to

help reduce physiologic stress response relating to acute substance withdrawal.  We

will be working with the patient in regard to making referrals for an inpatient

substance abuse treatment program, which is the patient's primary interest at this

time.  We will focus on stabilization and discharge planning.





LAVELL / SHAHBAZ: [id number] / Job#: [id number]

## 2021-05-23 NOTE — P.CONS
History of Present Illness





- Reason for Consult


Medical clearance





- History of Present Illness


Patient is a pleasant 21-year-old female admitted to my service about 4 days ago

for the unintentional overdose on amphetamine/ecstasy.  Patient did well was 

subsequently transferred to psychiatric floor.  Patient did complain of suicidal

ideation following day and the patient wanted to be weren't relieved be admitted

to psychiatric floor.  Patient denied any physical complaints related doing 

well.








Review of Systems











REVIEW OF SYSTEMS: 


CONSTITUTIONAL: No fever, no malaise, no fatigue. 


HEENT: No recent visual problems or hearing problems. Denied any sore throat. 


CARDIOVASCULAR: No chest pain, orthopnea, PND, no palpitations, no syncope. 


PULMONARY: No shortness of breath, no cough, no hemoptysis. 


GASTROINTESTINAL: No diarrhea, no nausea, no vomiting, no abdominal pain. 


NEUROLOGICAL: No headaches, no weakness, no numbness. 


HEMATOLOGICAL: Denies any bleeding or petechiae. 


GENITOURINARY: Denies any burning micturition, frequency, or urgency. 


MUSCULOSKELETAL/RHEUMATOLOGICAL: Denies any joint pain, swelling, or any muscle 

pain. 


ENDOCRINE: Denies any polyuria or polydipsia. 





The rest of the 14-point review of systems is negative.








Past Medical History


Past Medical History: No Reported History


Additional Past Medical History / Comment(s): Thyroid Nodule Removal


History of Any Multi-Drug Resistant Organisms: None Reported


Past Surgical History: Tonsillectomy


Additional Past Surgical History / Comment(s): thyroid nodule removed, ganglion 

cyst removed.  depression,anxiety,bipolar,cutting


Past Anesthesia/Blood Transfusion Reactions: No Reported Reaction


Past Psychological History: Anxiety, Bipolar, Depression


Smoking Status: Current every day smoker


Past Alcohol Use History: Rare


Additional Past Alcohol Use History / Comment(s): Pt. reports that she rarely 

drinks alcohol. Serum Alcohol not documented, BAT not documented.


Past Drug Use History: Marijuana, Methamphetamine, Prescription Drug Abuse


Additional Drug Use History / Comment(s): UDS + Amphetamines, Methamphetamines, 

Benzos and THC.





- Past Family History


  ** Mother


Family Medical History: No Reported History





Medications and Allergies


                                Home Medications











 Medication  Instructions  Recorded  Confirmed  Type


 


Etonogestrel/Ethinyl Estradiol 1 ring VAGINAL Q28D 05/20/21 05/22/21 History





[Eluryng Vaginal Ring]    


 


Omeprazole 40 mg PO DAILY 05/20/21 05/22/21 History


 


OLANZapine [ZyPREXA] 5 mg PO TID  tab 05/22/21 05/22/21 Rx








                                    Allergies











Allergy/AdvReac Type Severity Reaction Status Date / Time


 


No Known Allergies Allergy   Verified 05/22/21 18:12














Physical Exam


Vitals: 


                                   Vital Signs











  Temp Pulse Resp BP Pulse Ox


 


 05/22/21 18:40  98.4 F  118 H  18  107/88  98


 


 05/22/21 18:14  98.4 F  118 H  18  107/88  98

















PHYSICAL EXAMINATION: 





GENERAL: The patient is alert and oriented x3, not in any acute distress. Well 

developed, well nourished. 


HEENT: Pupils are round and equally reacting to light. EOMI. No scleral icterus.

No conjunctival pallor. Normocephalic, atraumatic. No pharyngeal erythema. No 

thyromegaly. 


CARDIOVASCULAR: S1 and S2 present. No murmurs, rubs, or gallops. 


PULMONARY: Chest is clear to auscultation, no wheezing or crackles. 


ABDOMEN: Soft, nontender, nondistended, normoactive bowel sounds. No palpable 

organomegaly. 


MUSCULOSKELETAL: No joint swelling or deformity.


EXTREMITIES: No cyanosis, clubbing, or pedal edema. 


NEUROLOGICAL: Gross neurological examination did not reveal any focal deficits. 


SKIN: No rashes. 

















Assessment and Plan


Plan: 


- bipolar disorder patient is presently on Zyprexa further management as per 

psychiatry


-Unintentional overdose on amphetamine /ecstasy and drug abuse patient will be 

discharged to drug abuse rehabitation program

## 2021-05-23 NOTE — PN
PROGRESS NOTE



DATE OF SERVICE:

05/23/2021



CHIEF COMPLAINT:

The patient had an apparent unintentional overdose on amphetamines and ecstasy.  She

had made statements about feeling she wanted to die.



INTERVAL HISTORY:

Patient has been doing fair. She had a quiet evening yesterday.  She comes out in the

day area some. She will wander about.  She interacts a little with others but for the

most part she tends to keep to herself.  She said she slept better last night and has

been sleeping better since starting the Zyprexa.  Today she has been up.  She has not

attended groups. Again, she has spent a fair amount of time in her room, though does

come out some.  She has been talking with her mother about followup planning.

Apparently her mother had identified some treatment programs that seem to be reasonable

situations for what the patient is hoping for.  She said her mother made contact

yesterday and that there would be some openings available on Monday.  The patient was

not clear as to which programs they are. Overall she says she feels a little more

relaxed and less distressed compared to the last couple days.  She does feel that the

Zyprexa has had some positives for her.  She notes that her mother was fairly insistent

on the idea that she would not be welcome to mother's home in an interim until she was

admitted to a substance unit.  She said mother did express concern about the patient's

statements in regard to self-harm as well as substance abuse issues.  In addition,

there continues to be questions about her children's custody.  She said that one of the

children is with her mother and the other is with the father's mother.  The patient

tolerates her Zyprexa.



MENTAL STATUS:

Patient sat with a little restlessness.  She gave fairly good eye contact.  She

answered questions with brief responses.  She did not say a lot.  Her thoughts were

clear.  Her affect was a little blunted.  She did have a relaxed manner and smiled a

little bit.  Her mood was reserved, though not clearly down or depressed. She did not

appear to be significantly distressed.  There was no indication of thought disorder.

She voiced no thoughts of harm.  Cognition was clear.



ASSESSMENT:

I will continue the current diagnosis and treatment plan.  The patient appears to be

managing reasonably well in regards to early withdrawal issues from multiple

substances.  She noted that her plan would be to make contact with some substance abuse

programs tomorrow with hope of setting up an admission as soon as possible.  We will

focus on stabilization and discharge planning.





MMHAY / IJN: [id number] / Job#: [id number]

## 2021-05-24 VITALS — TEMPERATURE: 97.2 F

## 2021-05-24 RX ADMIN — NICOTINE SCH: 14 PATCH, EXTENDED RELEASE TRANSDERMAL at 08:51

## 2021-05-24 NOTE — P.PN
Progress Note - Text


Progress Note Date: 05/24/21





Interval History:


Patient was seen wandering the hallways after being on the phone and was direc

table and agreeable to speak with writer in the office.  Patient appears to be 

fairly withdrawn and states that she has a history of bipolar disorder.  She 

spoke about feeling depressed however claims that she is mildly improving.  She 

does claim that she is feeling tired during the day.  She states that she was 

able to sleep throughout the night time and feels that she is feeling 

oversedated.  She claims that she spoke with her mother about going to rehab and

once be discharged today to go to [facility].  She states that she was using a 

significant amount of Xanax and methamphetamine prior to coming into the 

hospital and "unintentionally overdosed".  She states that she has been trying 

to go to groups as much as she can.  At this time patient denies any suicidal or

homical ideations, intent or plan. Patient denies any auditory, visual 

hallucinations and denies any paranoia or delusions. Patient denies any side 

effects from the medications and has been compliant with meds. 





Mental Status Exam:


General Appearance: Patient appears to be tall, stated age is alert, directable,

and cooperative.  Has several tattoos 


Behavior: Patient is calmly seated without any agitated behavior.


Speech: Patient's speech is fluent and nonpressured.  Soft spoken


Mood/Affect: Mood is improving mildly however is depressed, affect is congruent 

and constricted. 


Suicidality/Homicidality:  Patient denies having any suicidal or homicidal 

ideation intent or plan.  


Perceptions: Patient denies any visual hallucinations and denies any auditory 

hallucinations  


Though content/process: Focused on discharge.  Minimizing her substance use.  

Goal oriented. 


Memory and concentration: AOX3, grossly intact for the purposes of this session


Judgment and insight: Poor, Improving mildly





Assessment


Bipolar disorder, currently depressed


Benzodiazepine abuse


Methamphetamine abuse


Nicotine dependence





Plan:


-Patient continues to meet criteria for inpatient psychiatric admission for 

symptom stabilization and safety. Patient has not signed medication consent and 

was placed in patient's chart.


-Medications: Changed patient's Zyprexa to 7.5 mg daily at bedtime for mood 

stabilization/insomnia and due to patient's oversedation.  Added Prozac 10 mg 

daily for mood.


-When necessary Ativan and Haldol for agitation/aggression.


-NRT - nicotine patch


-SW on board for discharge planning.  Encouraged the patient to participate in 

milieu.  Patient apparently is called [facility] and will arrange for rehab upon 

discharge.  Likely discharge tomorrow.

## 2021-05-25 RX ADMIN — NICOTINE SCH: 14 PATCH, EXTENDED RELEASE TRANSDERMAL at 10:15

## 2021-05-25 NOTE — P.DS
Providers


Date of admission: 


05/22/21 17:03





Expected date of discharge: 05/25/21


Attending physician: 


Mario Burden MD





Consults: 





                                        





05/22/21 17:20


Consult Physician Routine 


   Consulting Provider: Shanika Toribio


   Consult Reason/Comments: medical management


   Do you want consulting provider notified?: Yes











Primary care physician: 


ELVIE Rodriguez








- Discharge Diagnosis(es)


(1) Bipolar depression


Current Visit: Yes   Status: Acute   Priority: High   





(2) Methamphetamine abuse


Current Visit: Yes   Status: Acute   Priority: High   





(3) Cannabis abuse


Current Visit: Yes   Status: Acute   Priority: Medium   





(4) Nicotine dependence


Current Visit: Yes   Status: Acute   Priority: Low   





(5) Benzodiazepine abuse


Current Visit: Yes   Status: Acute   Priority: Medium   


Hospital Course: 





Admission HPI:


Admission note was completed by Dr. Sanz "the patient is a 21-year-old female.

 She was living with her boyfriend and 2 children.  She was referred to the ED 

for evaluation.  The patient apparently had either overdose or taking excessive 

amount of street drugs on the day prior to admission.  She was amnestic to 

events.  She was quite tremulous.  The patient and mother were together and when

I did the interview and both provided some of the information regarding her 

history.  The patient has been diagnosed with bipolar disorder.  She also has 

significant substance use issues.  It is noticed that she gave birth to her 

second child February 22.  There was concern at that time for a postpartum 

depression.  Also after giving birth, the patient got involved in using 

methamphetamines, which has been a long-term issue for her.  She has had long-

term substance abuse problems.  Apparently at least early in the pregnancy, the 

patient got off drugs though it is not clear the extent of that.  She apparently

has been using drugs fairly consistently since February 22.  She has home with 

her boyfriend and HER-2 children and a 19-month-old and a 3-month-old.  She had 

taken an excessive amount of street drugs.  Apparently there was concern that 

they may have been laced with other drugs as well.  Details of this are not 

clear.  On admission the urine drug screen was positive for amphetamines, 

methamphetamines, benzodiazepines and THC.  She apparently had become quite 

somnolent.  Her boyfriend called CPS as part of responding to her situation.  

The issue with the CPS is not clear though according to the mother, CPS is 

indicating that the patient needs to be in substance abuse treatment before she 

could be back in contact with her children, but a loan of custody.  It is noted 

that the patient has had long-term substance abuse issues.  She had a 

psychiatric admission at [facility] in 2016 for a cutting behavior.  She said 

that her longest period of sobriety from any abuse of substances was for about 6

months, though she was vague about the details of that.  Currently she has been 

prescribed Celexa 40 mg a day and Lamictal 100 mg a day.  The chart indicated 

that she also has been prescribed Xanax 0.25 mg when necessary and Atarax 50 mg 

every 6 hours when necessary as home medications prior to coming into the [facility].  The patient was vague as to how consistently she had been taking her 

medications.  She gave a history suggestive of some bipolar symptoms with 

episodes of veronica where she would have decreased need for sleep, high energy, 

excessive activity and elevated mood.  She was not able to clear how much these 

episodes may have been related to substance use issues.  She would note these 

episodes would go on for a number of days and she then currently get into 

periods of becoming depressed, where she was withdrawn and had poor motivation 

and interest.  It is not clear what mental health follow-up the patient has had.

 She was started on Zyprexa with the dose increased yesterday to 5 mg 3 times a 

day with the indication for addressing physiological stress response related to 

acute substance withdrawal.  The patient is admitted for further evaluation.  "





Hospital course:


Upon admission to the unit patient was initially directable and agreeable to 

commence treatment and signed adult voluntary form. Patient got along well with 

other patients on the unit and followed unit protocol.  Patient was compliant 

with the medications and denied any side effects throughout hospital course.  

Patient was started on Zyprexa 5 mg 3 times a day however was then decreased 

down to 7.5 mg daily at bedtime for mood stabilization/insomnia due to patient's

oversedation.  Patient was also started on Prozac 10 mg daily for mood/anxiety. 

Patient spoke of her stressors and engaged in therapy both group and individual.

 Patient was also seen by medical team for history and physical exam.  

Throughout the course of the hospitalization patient gradually improved with 

regards to mood, anxiety, sleep and became more future oriented with improved 

insight and judgment. On the day of discharge patient denied any suicidal or 

homicidal ideations intent or plan denied any auditory or visual hallucinations.

Patient endorsed wanting to live for her health and her family.  The patient 

denied any access to guns or weapons.  Patient denied any paranoia and did not 

endorse any delusions.  Patient does have a significant history of substance 

abuse and was counseled on abstaining from all substances including alcohol and 

marijuana.  The patient was agreeable to [place] for rehab for her substance 

abuse issues.  Patient was also counseled on the medications and need for 

regular compliance and was encouraged to follow-up with their outpatient 

appointment for mental health and also for primary care.  Prior to discharge a 

family meeting will be arranged by  to answer any questions and 

ensure safety upon discharge.





Mental status exam:


General Appearance: Patient appears to be tall, stated age is alert, pleasant, 

and cooperative. Patient is in no acute distress and has improved hygiene and gr

ooming 


Behavior: Patient is calmly seated without any agitated behavior.


Speech: Patient's speech is fluent and nonpressured. 


Mood/Affect: Patient reports their mood is "good", affect is congruent and 

euthymic. 


Suicidality/Homicidality:  Patient denies having any suicidal or homicidal 

ideation intent or plan.  


Perceptions: Patient denies any auditory or visual hallucinations.  


Though content/process: There is no evidence of any delusional thought content 

and thought process is linear and goal-directed. more future oriented


Memory and concentration: AOX3, grossly intact for the purposes of this session.

Can spell "WORLD" backwards correctly.


Judgment and insight: chronically poor, however has improved with guarded 

prognosis





Impression:


Bipolar disorder, currently depressed


Benzodiazepine abuse


Methamphetamine abuse


Nicotine dependence





Plan:


-Continue with discharge today as patient has improved and stabilized 

psychiatrically and is not currently an imminent threat to herself and/or 

others. Patient will remain at chronically elevated risk for harm to self and/or

others due to her impulsivity and polysubstance abuse.


-Continue medications: Zyprexa 7.5 mg daily at bedtime for mood 

stabilization/insomnia, Prozac 10 mg daily for mood/anxiety.


-Patient was counseled on the need for medication compliance and appropriate 

follow-up at mental health and also primary care for medical issues.  Patient 

verbalized understanding and agreed.


-Social work to arrange for and conduct family meeting to ensure safety upon 

discharge and answer any questions/concerns. Social work also to arrange for 

patients follow up appointments with [facility] for psychiatric care along with follow 

up with primary care provider.


-Patient counseled on abstaining from recreational drugs and marijuana and alco

hol. Was informed/educated on the adverse effects on their physical and mental 

health. Patient verbally agreed and understood.  Patient will be [facility] 

[facility] to [facility] and will be picked up by her mother.


-Patient was instructed to return to the hospital or seek immediate medical care

if their psychiatric or medical symptoms do worsen or reoccur.








                                    Allergies











Allergy/AdvReac Type Severity Reaction Status Date / Time


 


No Known Allergies Allergy   Verified 05/22/21 18:12











                                   Vital Signs











Temp  97.2 F L  05/24/21 16:43


 


Pulse  118 H  05/22/21 18:40


 


Resp  18   05/22/21 18:40


 


BP  107/88   05/22/21 18:40


 


Pulse Ox  98   05/22/21 18:40











Patient Condition at Discharge: Stable





Plan - Discharge Summary


New Discharge Prescriptions: 


New


   Nicotine 14Mg/24Hr Patch [Habitrol] 1 patch TRANSDERM DAILY 14 Days  patch


   FLUoxetine HCL [PROzac] 10 mg PO DAILY 30 Days  cap


   OLANZapine [ZyPREXA] 7.5 mg PO HS 30 Days  tab





Continue


   Etonogestrel/Ethinyl Estradiol [Eluryng Vaginal Ring] 1 ring VAGINAL Q28D


   Omeprazole 40 mg PO DAILY





Discontinued


   OLANZapine [ZyPREXA] 5 mg PO TID  tab


Discharge Medication List





Etonogestrel/Ethinyl Estradiol [Eluryng Vaginal Ring] 1 ring VAGINAL Q28D 

05/20/21 [History]


Omeprazole 40 mg PO DAILY 05/20/21 [History]


FLUoxetine HCL [PROzac] 10 mg PO DAILY 30 Days  cap 05/25/21 [Rx]


Nicotine 14Mg/24Hr Patch [Habitrol] 1 patch TRANSDERM DAILY 14 Days  patch 

05/25/21 [Rx]


OLANZapine [ZyPREXA] 7.5 mg PO HS 30 Days  tab 05/25/21 [Rx]








Follow up Appointment(s)/Referral(s): 


intake,intake [Other] - 05/25/21 1:00 pm


Activity/Diet/Wound Care/Special Instructions: 


Activity and diet as tolerated. Avoid the use of street drugs and alcohol. Take 

all medications as prescribed. When you are in need of refills on your medicati

ons please contact your medical provider and/or outpatient psychiatrist to have 

this done. Please go to scheduled outpatient appointment for aftercare 

treatment. If symptoms return or become worse, call the crisis line at 

[phone] and/or go to the nearest emergency room for evaluation.


Discharge Disposition: OTHER INSTITUTION NOT DEFINED

## 2022-06-10 ENCOUNTER — HOSPITAL ENCOUNTER (EMERGENCY)
Dept: HOSPITAL 47 - EC | Age: 22
Discharge: HOME | End: 2022-06-10
Payer: COMMERCIAL

## 2022-06-10 VITALS — TEMPERATURE: 98.9 F

## 2022-06-10 VITALS — SYSTOLIC BLOOD PRESSURE: 115 MMHG | DIASTOLIC BLOOD PRESSURE: 73 MMHG | RESPIRATION RATE: 16 BRPM | HEART RATE: 101 BPM

## 2022-06-10 DIAGNOSIS — T40.1X1A: Primary | ICD-10-CM

## 2022-06-10 DIAGNOSIS — Z79.899: ICD-10-CM

## 2022-06-10 DIAGNOSIS — F41.9: ICD-10-CM

## 2022-06-10 DIAGNOSIS — F17.200: ICD-10-CM

## 2022-06-10 DIAGNOSIS — F15.90: ICD-10-CM

## 2022-06-10 DIAGNOSIS — F31.9: ICD-10-CM

## 2022-06-10 DIAGNOSIS — F12.90: ICD-10-CM

## 2022-06-10 LAB
ALBUMIN SERPL-MCNC: 4.5 G/DL (ref 3.5–5)
ALP SERPL-CCNC: 105 U/L (ref 38–126)
ALT SERPL-CCNC: 32 U/L (ref 4–34)
ANION GAP SERPL CALC-SCNC: 11 MMOL/L
AST SERPL-CCNC: 59 U/L (ref 14–36)
BUN SERPL-SCNC: 6 MG/DL (ref 7–17)
CALCIUM SPEC-MCNC: 8.7 MG/DL (ref 8.4–10.2)
CHLORIDE SERPL-SCNC: 104 MMOL/L (ref 98–107)
CO2 SERPL-SCNC: 19 MMOL/L (ref 22–30)
GLUCOSE SERPL-MCNC: 127 MG/DL (ref 74–99)
GLUCOSE UR QL: (no result)
PH UR: 7 [PH] (ref 5–8)
POTASSIUM SERPL-SCNC: 4 MMOL/L (ref 3.5–5.1)
PROT SERPL-MCNC: 7.3 G/DL (ref 6.3–8.2)
PROT UR QL: (no result)
RBC UR QL: 2 /HPF (ref 0–5)
SODIUM SERPL-SCNC: 134 MMOL/L (ref 137–145)
SP GR UR: 1 (ref 1–1.03)
SQUAMOUS UR QL AUTO: 5 /HPF (ref 0–4)
UROBILINOGEN UR QL STRIP: <2 MG/DL (ref ?–2)
WBC # UR AUTO: 32 /HPF (ref 0–5)

## 2022-06-10 PROCEDURE — 80053 COMPREHEN METABOLIC PANEL: CPT

## 2022-06-10 PROCEDURE — 81001 URINALYSIS AUTO W/SCOPE: CPT

## 2022-06-10 PROCEDURE — 99284 EMERGENCY DEPT VISIT MOD MDM: CPT

## 2022-06-10 PROCEDURE — 96372 THER/PROPH/DIAG INJ SC/IM: CPT

## 2022-06-10 PROCEDURE — 36415 COLL VENOUS BLD VENIPUNCTURE: CPT

## 2022-06-10 PROCEDURE — 80306 DRUG TEST PRSMV INSTRMNT: CPT

## 2022-06-10 PROCEDURE — 87086 URINE CULTURE/COLONY COUNT: CPT

## 2022-06-10 NOTE — ED
Overdose HPI





- General


Chief Complaint: Overdose


Stated Complaint: overdose


Source: EMS


Mode of arrival: EMS


Limitations: no limitations





- History of Present Illness


Initial Comments: 





22-year-old female presents emergency department as an overdose.  EMS states 

that the patient was at home alone.  Her stepfather came home and found her 

unresponsive on the floor.  EMS was called and administered 4 mg of intranasal 

Narcan and 0.5 mg of IV Narcan.  Patient did awaken and stated that she injected

heroin.  Patient has a history of this and has been to rehab a few times.  

States that she was attempting to get high.  Denies trying to harm herself.  No 

concern for pregnancy.  States that she just recently relapsed and has not been 

on heroin for an extended period of time.  Denies any other drug use.  No other 

alleviating, precipitating or modifying factors





- Related Data


                                Home Medications











 Medication  Instructions  Recorded  Confirmed


 


Citalopram Hydrobromide [Celexa] 40 mg PO HS 06/10/22 06/10/22


 


Lithium Carbonate 900 mg PO HS 06/10/22 06/10/22


 


Mirtazapine [Remeron] 30 mg PO HS 06/10/22 06/10/22


 


QUEtiapine FUMARATE [SEROquel] 300 mg PO HS 06/10/22 06/10/22


 


acetaZOLAMIDE [Diamox Sequels] 500 mg PO BID 06/10/22 06/10/22








                                  Previous Rx's











 Medication  Instructions  Recorded


 


Cephalexin [Keflex] 500 mg PO BID 1 Days #14 cap 06/10/22











                                    Allergies











Allergy/AdvReac Type Severity Reaction Status Date / Time


 


No Known Allergies Allergy   Verified 06/10/22 19:18














Review of Systems


ROS Statement: 


Those systems with pertinent positive or pertinent negative responses have been 

documented in the HPI.





ROS Other: All systems not noted in ROS Statement are negative.





Past Medical History


Past Medical History: No Reported History


Additional Past Medical History / Comment(s): Thyroid Nodule Removal


History of Any Multi-Drug Resistant Organisms: None Reported


Past Surgical History: Tonsillectomy


Additional Past Surgical History / Comment(s): thyroid nodule removed, ganglion 

cyst removed.  depression,anxiety,bipolar,cutting


Past Anesthesia/Blood Transfusion Reactions: No Reported Reaction


Past Psychological History: Anxiety, Bipolar, Depression


Smoking Status: Current every day smoker


Past Alcohol Use History: Rare


Past Drug Use History: Marijuana, Methamphetamine, Prescription Drug Abuse





- Past Family History


  ** Mother


Family Medical History: No Reported History





General Exam


Limitations: no limitations





Course


                                   Vital Signs











  06/10/22 06/10/22





  17:18 20:10


 


Temperature 98.9 F 


 


Pulse Rate 132 H 101 H


 


Respiratory 24 16





Rate  


 


Blood Pressure 132/89 115/73


 


O2 Sat by Pulse 98 98





Oximetry  














Medical Decision Making





- Medical Decision Making





Upon arrival patient was placed into room 4.  A thorough history and physical 

exam was performed.  Patient is agitated and visibly uncomfortable.  We 

attempted an IV however have difficulty with this.  Patient is therefore given 5

 mg of Valium IM.  We are then able to obtain laboratory studies.  Patient is 

watched in the emergency department for 3 hours and has no sedation.  Patient 

denies that she was trying to harm herself.  Mother is at bedside.  She is 

requesting rehab information or patient is not open to rehab at this time.  She 

is alert, oriented, capable of making her own decisions and therefore will be 

discharged at this time.  Stressed to stop using illicit drugs return for any 

new worsening symptoms.  Patient agreed with the plan and was discharged home in

 stable condition





- Lab Data


Result diagrams: 


                                 06/10/22 18:08


                                   Lab Results











  06/10/22 06/10/22 06/10/22 Range/Units





  17:58 17:58 18:08 


 


Sodium    134 L  (137-145)  mmol/L


 


Potassium    4.0  (3.5-5.1)  mmol/L


 


Chloride    104  ()  mmol/L


 


Carbon Dioxide    19 L  (22-30)  mmol/L


 


Anion Gap    11  mmol/L


 


BUN    6 L  (7-17)  mg/dL


 


Creatinine    0.90  (0.52-1.04)  mg/dL


 


Est GFR (CKD-EPI)AfAm    >90  (>60 ml/min/1.73 sqM)  


 


Est GFR (CKD-EPI)NonAf    >90  (>60 ml/min/1.73 sqM)  


 


Glucose    127 H  (74-99)  mg/dL


 


Calcium    8.7  (8.4-10.2)  mg/dL


 


Total Bilirubin    0.9  (0.2-1.3)  mg/dL


 


AST    59 H  (14-36)  U/L


 


ALT    32  (4-34)  U/L


 


Alkaline Phosphatase    105  ()  U/L


 


Total Protein    7.3  (6.3-8.2)  g/dL


 


Albumin    4.5  (3.5-5.0)  g/dL


 


Urine Color  Light Yellow    


 


Urine Appearance  Cloudy H    (Clear)  


 


Urine pH  7.0    (5.0-8.0)  


 


Ur Specific Gravity  1.004    (1.001-1.035)  


 


Urine Protein  1+ H    (Negative)  


 


Urine Glucose (UA)  3+ H    (Negative)  


 


Urine Ketones  Negative    (Negative)  


 


Urine Blood  Negative    (Negative)  


 


Urine Nitrite  Negative    (Negative)  


 


Urine Bilirubin  Negative    (Negative)  


 


Urine Urobilinogen  <2.0    (<2.0)  mg/dL


 


Ur Leukocyte Esterase  Large H    (Negative)  


 


Urine RBC  2    (0-5)  /hpf


 


Urine WBC  32 H    (0-5)  /hpf


 


Urine WBC Clumps  Rare H    (None)  /hpf


 


Ur Squamous Epith Cells  5 H    (0-4)  /hpf


 


Urine Bacteria  Rare H    (None)  /hpf


 


Urine Mucus  Rare H    (None)  /hpf


 


Urine Opiates Screen   Detected H   (NotDetected)  


 


Ur Oxycodone Screen   Not Detected   (NotDetected)  


 


Urine Methadone Screen   Not Detected   (NotDetected)  


 


Ur Propoxyphene Screen   Not Detected   (NotDetected)  


 


Ur Barbiturates Screen   Not Detected   (NotDetected)  


 


U Tricyclic Antidepress   Not Detected   (NotDetected)  


 


Ur Phencyclidine Scrn   Not Detected   (NotDetected)  


 


Ur Amphetamines Screen   Detected H   (NotDetected)  


 


U Methamphetamines Scrn   Detected H   (NotDetected)  


 


U Benzodiazepines Scrn   Not Detected   (NotDetected)  


 


Urine Cocaine Screen   Not Detected   (NotDetected)  


 


U Marijuana (THC) Screen   Not Detected   (NotDetected)  














Disposition


Clinical Impression: 


 Heroin overdose





Disposition: HOME SELF-CARE


Condition: Stable


Instructions (If sedation given, give patient instructions):  Adult Overdose 

(ED)


Additional Instructions: 


We recommend that you stop using illicit drugs.  Please attend rehab.  Follow up

 with your doctor in 2-4 days and return for any new or worsening symptoms


Prescriptions: 


Cephalexin [Keflex] 500 mg PO BID 1 Days #14 cap


Is patient prescribed a controlled substance at d/c from ED?: No


Referrals: 


ELVIE Rodriguez III, MD [Primary Care Provider] - 1-2 days


Time of Disposition: 20:09

## 2022-06-11 ENCOUNTER — HOSPITAL ENCOUNTER (EMERGENCY)
Dept: HOSPITAL 47 - EC | Age: 22
LOS: 1 days | Discharge: HOME | End: 2022-06-12
Payer: COMMERCIAL

## 2022-06-11 VITALS — TEMPERATURE: 98.5 F

## 2022-06-11 DIAGNOSIS — T50.7X2A: ICD-10-CM

## 2022-06-11 DIAGNOSIS — F41.9: ICD-10-CM

## 2022-06-11 DIAGNOSIS — F17.200: ICD-10-CM

## 2022-06-11 DIAGNOSIS — Z79.899: ICD-10-CM

## 2022-06-11 DIAGNOSIS — F31.9: ICD-10-CM

## 2022-06-11 DIAGNOSIS — F12.90: ICD-10-CM

## 2022-06-11 DIAGNOSIS — T40.1X2A: Primary | ICD-10-CM

## 2022-06-11 DIAGNOSIS — F15.90: ICD-10-CM

## 2022-06-11 PROCEDURE — 99285 EMERGENCY DEPT VISIT HI MDM: CPT

## 2022-06-11 PROCEDURE — 82075 ASSAY OF BREATH ETHANOL: CPT

## 2022-06-11 PROCEDURE — 80306 DRUG TEST PRSMV INSTRMNT: CPT

## 2022-06-11 NOTE — ED
Psych HPI





- General


Chief Complaint: Overdose


Stated Complaint: Overdose


Time Seen by Provider: 06/11/22 23:41


Source: patient, RN notes reviewed, old records reviewed, Caregiver


Mode of arrival: EMS


Limitations: no limitations





- History of Present Illness


Initial Comments: 





This is a 22-year-old female DF for evaluation today.  Patient's presenting for 

her second heroin overdose in 2 days requiring Narcan.  Patient will be petition

by her mom for psychiatric evaluation and treatment


MD Complaint: suicidal ideation, feels depressed


-: minutes(s)


Associated Psychiatric Symptoms: depression, suicidal ideation


History of same: Yes


Quality: constant


Improves With: none


Worsens With: none


Context: recent drug abuse


Associated Symptoms: denies other symptoms


Treatments Prior to Arrival: placed on mental health hold


If Self Harm: admits thoughts of self harm





- Related Data


                                Home Medications











 Medication  Instructions  Recorded  Confirmed


 


Citalopram Hydrobromide [Celexa] 40 mg PO HS 06/10/22 06/10/22


 


Lithium Carbonate 900 mg PO HS 06/10/22 06/10/22


 


Mirtazapine [Remeron] 30 mg PO HS 06/10/22 06/10/22


 


QUEtiapine FUMARATE [SEROquel] 300 mg PO HS 06/10/22 06/10/22


 


acetaZOLAMIDE [Diamox Sequels] 500 mg PO BID 06/10/22 06/10/22








                                  Previous Rx's











 Medication  Instructions  Recorded


 


Cephalexin [Keflex] 500 mg PO BID 1 Days #14 cap 06/10/22











                                    Allergies











Allergy/AdvReac Type Severity Reaction Status Date / Time


 


No Known Allergies Allergy   Verified 06/10/22 19:18














Review of Systems


ROS Statement: 


Those systems with pertinent positive or pertinent negative responses have been 

documented in the HPI.





ROS Other: All systems not noted in ROS Statement are negative.





Past Medical History


Past Medical History: No Reported History


Additional Past Medical History / Comment(s): Thyroid Nodule Removal


History of Any Multi-Drug Resistant Organisms: None Reported


Past Surgical History: Tonsillectomy


Additional Past Surgical History / Comment(s): thyroid nodule removed, ganglion 

cyst removed.  depression,anxiety,bipolar,cutting


Past Anesthesia/Blood Transfusion Reactions: No Reported Reaction


Past Psychological History: Anxiety, Bipolar, Depression


Smoking Status: Current every day smoker


Past Alcohol Use History: Rare


Past Drug Use History: Marijuana, Methamphetamine, Prescription Drug Abuse





- Past Family History


  ** Mother


Family Medical History: No Reported History





General Exam


Limitations: no limitations


General appearance: alert, in no apparent distress


Head exam: Present: atraumatic, normocephalic, normal inspection


Eye exam: Present: normal appearance, PERRL, EOMI.  Absent: scleral icterus, 

conjunctival injection, periorbital swelling


ENT exam: Present: normal exam, mucous membranes moist


Neck exam: Present: normal inspection.  Absent: tenderness, meningismus, 

lymphadenopathy


Respiratory exam: Present: normal lung sounds bilaterally.  Absent: respiratory 

distress, wheezes, rales, rhonchi, stridor


Cardiovascular Exam: Present: normal rhythm, tachycardia, normal heart sounds.  

Absent: systolic murmur, diastolic murmur, rubs, gallop, clicks


GI/Abdominal exam: Present: soft, normal bowel sounds.  Absent: distended, 

tenderness, guarding, rebound, rigid


Extremities exam: Present: normal inspection, full ROM, normal capillary refill.

  Absent: tenderness, pedal edema, joint swelling, calf tenderness


Back exam: Present: normal inspection


Neurological exam: Present: alert, oriented X3, CN II-XII intact


Psychiatric exam: Present: normal affect, normal mood


Skin exam: Present: warm, dry, intact, normal color.  Absent: rash





Course


                                   Vital Signs











  06/11/22





  23:44


 


Temperature 98.5 F


 


Pulse Rate 125 H


 


Respiratory 18





Rate 


 


Blood Pressure 127/93


 


O2 Sat by Pulse 98





Oximetry 














- Reevaluation(s)


Reevaluation #1: 





06/11/22 23:49


Medical record is reviewed


06/11/22 23:49


Medical clear for psychiatric evaluation





Medical Decision Making





- Medical Decision Making





22 female who was seen and evaluated by psychiatry, she denies homicidal or 

suicidal thoughts here in the emergency department seen by psych and deemed 

stable for discharge home





- Lab Data


                                   Lab Results











  06/12/22 Range/Units





  02:00 


 


Urine Opiates Screen  Not Detected  (NotDetected)  


 


Ur Oxycodone Screen  Not Detected  (NotDetected)  


 


Urine Methadone Screen  Not Detected  (NotDetected)  


 


Ur Propoxyphene Screen  Not Detected  (NotDetected)  


 


Ur Barbiturates Screen  Not Detected  (NotDetected)  


 


U Tricyclic Antidepress  Detected H  (NotDetected)  


 


Ur Phencyclidine Scrn  Not Detected  (NotDetected)  


 


Ur Amphetamines Screen  Detected H  (NotDetected)  


 


U Methamphetamines Scrn  Detected H  (NotDetected)  


 


U Benzodiazepines Scrn  Detected H  (NotDetected)  


 


Urine Cocaine Screen  Not Detected  (NotDetected)  


 


U Marijuana (THC) Screen  Not Detected  (NotDetected)  














Disposition


Clinical Impression: 


 Poisoning by opiates and related narcotics, other, Heroin overdose





Disposition: HOME SELF-CARE


Condition: Fair


Instructions (If sedation given, give patient instructions):  Adult Overdose 

(ED)


Is patient prescribed a controlled substance at d/c from ED?: No


Referrals: 


ELVIE Rodriguez III, MD [Primary Care Provider] - 1-2 days


Time of Disposition: 04:30

## 2022-06-12 VITALS — RESPIRATION RATE: 16 BRPM | DIASTOLIC BLOOD PRESSURE: 72 MMHG | SYSTOLIC BLOOD PRESSURE: 114 MMHG | HEART RATE: 101 BPM

## 2022-08-06 ENCOUNTER — HOSPITAL ENCOUNTER (EMERGENCY)
Dept: HOSPITAL 47 - EC | Age: 22
Discharge: OTHER INSTITUTION | End: 2022-08-06
Payer: COMMERCIAL

## 2022-08-06 VITALS
DIASTOLIC BLOOD PRESSURE: 90 MMHG | SYSTOLIC BLOOD PRESSURE: 150 MMHG | TEMPERATURE: 97.5 F | HEART RATE: 97 BPM | RESPIRATION RATE: 18 BRPM

## 2022-08-06 DIAGNOSIS — F17.200: ICD-10-CM

## 2022-08-06 DIAGNOSIS — Z02.89: Primary | ICD-10-CM

## 2022-08-06 NOTE — ED
Medical Clearance HPI





- General


Chief complaint: Medical Clearance


Stated complaint: detention clearance


Time Seen by Provider: 08/06/22 02:49


Source: police


Mode of arrival: ambulatory


Limitations: no limitations





- History of Present Illness


MD Complaint: medical clearance requested


-: minutes(s)


Reason for Medical Clearance: intoxication


Place: other


Alleged Intoxication: Yes


Traumatic Symptoms: denies traumatic injury


Treatments Prior to Arrival: none


Home medications: 


                                Home Medications











 Medication  Instructions  Recorded  Confirmed


 


Citalopram Hydrobromide [Celexa] 40 mg PO HS 06/10/22 06/10/22


 


Lithium Carbonate 900 mg PO HS 06/10/22 06/10/22


 


Mirtazapine [Remeron] 30 mg PO HS 06/10/22 06/10/22


 


QUEtiapine FUMARATE [SEROquel] 300 mg PO HS 06/10/22 06/10/22


 


acetaZOLAMIDE [Diamox Sequels] 500 mg PO BID 06/10/22 06/10/22








                                  Previous Rx's











 Medication  Instructions  Recorded


 


Cephalexin [Keflex] 500 mg PO BID 1 Days #14 cap 06/10/22











Allergies/Adverse reactions: 


                                    Allergies











Allergy/AdvReac Type Severity Reaction Status Date / Time


 


No Known Allergies Allergy   Verified 06/10/22 19:18














Review of Systems


ROS Statement: 


Those systems with pertinent positive or pertinent negative responses have been 

documented in the HPI.





ROS Other: All systems not noted in ROS Statement are negative.





Past Medical History


Past Medical History: No Reported History


Additional Past Medical History / Comment(s): Thyroid Nodule Removal


History of Any Multi-Drug Resistant Organisms: None Reported


Past Surgical History: Tonsillectomy


Additional Past Surgical History / Comment(s): thyroid nodule removed, ganglion 

cyst removed.  depression,anxiety,bipolar,cutting


Past Anesthesia/Blood Transfusion Reactions: No Reported Reaction


Past Psychological History: Anxiety, Bipolar, Depression


Smoking Status: Current every day smoker


Past Alcohol Use History: Rare


Past Drug Use History: Marijuana, Methamphetamine, Prescription Drug Abuse





- Past Family History


  ** Mother


Family Medical History: No Reported History





General Exam


General appearance: alert, in no apparent distress, appears intoxicated


Head exam: Present: atraumatic, normocephalic


Eye exam: Present: normal appearance.  Absent: scleral icterus, conjunctival 

injection


Cardiovascular Exam: Present: regular rate, normal rhythm, normal heart sounds. 

 Absent: systolic murmur, diastolic murmur, rubs, gallop


GI/Abdominal exam: Present: soft.  Absent: distended, tenderness, guarding, 

rebound, rigid, mass


Extremities exam: Present: normal inspection, normal capillary refill.  Absent: 

pedal edema, calf tenderness


Back exam: Present: normal inspection.  Absent: CVA tenderness (R), CVA 

tenderness (L)


Neurological exam: Present: alert


Skin exam: Present: warm, dry, intact, normal color.  Absent: rash





Course


                                   Vital Signs











  08/06/22





  02:50


 


Temperature 97.5 F L


 


Pulse Rate 97


 


Respiratory 18





Rate 


 


Blood Pressure 150/90


 


O2 Sat by Pulse 99





Oximetry 














Disposition


Clinical Impression: 


 Encounter for medical clearance for patient hold





Disposition: OTHER INSTITUTION NOT DEFINED


Condition: Good


Is patient prescribed a controlled substance at d/c from ED?: No


Referrals: 


ELVIE Rodriguez III, MD [Primary Care Provider] - 1-2 days





- Out of Hospital Transfer - Req. Specs


Out of Hospital Transfer - Requested Specifics: Other Non-Acute (Police custody)

## 2023-05-04 ENCOUNTER — HOSPITAL ENCOUNTER (EMERGENCY)
Dept: HOSPITAL 47 - EC | Age: 23
Discharge: HOME | End: 2023-05-04
Payer: COMMERCIAL

## 2023-05-04 VITALS
SYSTOLIC BLOOD PRESSURE: 137 MMHG | RESPIRATION RATE: 19 BRPM | TEMPERATURE: 98 F | DIASTOLIC BLOOD PRESSURE: 69 MMHG | HEART RATE: 132 BPM

## 2023-05-04 DIAGNOSIS — F41.9: ICD-10-CM

## 2023-05-04 DIAGNOSIS — F15.10: ICD-10-CM

## 2023-05-04 DIAGNOSIS — F12.90: ICD-10-CM

## 2023-05-04 DIAGNOSIS — Z79.899: ICD-10-CM

## 2023-05-04 DIAGNOSIS — F31.9: ICD-10-CM

## 2023-05-04 DIAGNOSIS — Z87.891: ICD-10-CM

## 2023-05-04 DIAGNOSIS — T40.1X1A: Primary | ICD-10-CM

## 2023-05-04 PROCEDURE — 99284 EMERGENCY DEPT VISIT MOD MDM: CPT

## 2023-05-04 NOTE — ED
Overdose HPI





- General


Chief Complaint: Overdose


Stated Complaint: Overdose


Time Seen by Provider: 05/04/23 14:45


Source: patient, EMS, RN notes reviewed


Mode of arrival: EMS


Limitations: no limitations





- History of Present Illness


Initial Comments: 


23-year-old female presents emergency Department with chief complaint of drug 

overdose.  Patient states she injected heroin.  Patient states that she's been 

in nursing home for 6 days started using again.  Patient has a long history of drug 

abuse.  Patient states this was not intentional friend called EMS and which 

patient received 2 mg of Narcan.  Patient has no complaints currently denies any

chest pain or palpitations nausea vomiting.








- Related Data


                                Home Medications











 Medication  Instructions  Recorded  Confirmed


 


Citalopram Hydrobromide [Celexa] 40 mg PO HS 06/10/22 06/10/22


 


Lithium Carbonate 900 mg PO HS 06/10/22 06/10/22


 


Mirtazapine [Remeron] 30 mg PO HS 06/10/22 06/10/22


 


QUEtiapine FUMARATE [SEROquel] 300 mg PO HS 06/10/22 06/10/22


 


acetaZOLAMIDE [Diamox Sequels] 500 mg PO BID 06/10/22 06/10/22








                                  Previous Rx's











 Medication  Instructions  Recorded


 


Cephalexin [Keflex] 500 mg PO BID 1 Days #14 cap 06/10/22











                                    Allergies











Allergy/AdvReac Type Severity Reaction Status Date / Time


 


No Known Allergies Allergy   Verified 05/04/23 14:30














Review of Systems


ROS Statement: 


Those systems with pertinent positive or pertinent negative responses have been 

documented in the HPI.





ROS Other: All systems not noted in ROS Statement are negative.





Past Medical History


Past Medical History: No Reported History


Additional Past Medical History / Comment(s): Thyroid Nodule Removal


History of Any Multi-Drug Resistant Organisms: None Reported


Past Surgical History: Tonsillectomy


Additional Past Surgical History / Comment(s): thyroid nodule removed, ganglion 

cyst removed.  depression,anxiety,bipolar,cutting


Past Anesthesia/Blood Transfusion Reactions: No Reported Reaction


Past Psychological History: Anxiety, Bipolar, Depression


Smoking Status: Former smoker


Past Alcohol Use History: None Reported


Past Drug Use History: Heroin, IV Drug Use, Marijuana, Methamphetamine, Prescri

ption Drug Abuse





- Past Family History


  ** Mother


Family Medical History: No Reported History





General Exam


Limitations: no limitations


General appearance: alert, in no apparent distress


Head exam: Present: atraumatic, normocephalic, normal inspection


Eye exam: Present: normal appearance, PERRL, EOMI.  Absent: scleral icterus, 

conjunctival injection, periorbital swelling


ENT exam: Present: normal exam, mucous membranes moist


Neck exam: Present: normal inspection, full ROM.  Absent: tenderness, 

meningismus, lymphadenopathy


Respiratory exam: Present: normal lung sounds bilaterally.  Absent: respiratory 

distress, wheezes, rales, rhonchi, stridor


Cardiovascular Exam: Present: normal rhythm, tachycardia, normal heart sounds.  

Absent: systolic murmur, diastolic murmur, rubs, gallop, clicks


GI/Abdominal exam: Present: soft, normal bowel sounds.  Absent: distended, 

tenderness, guarding, rebound, rigid


Neurological exam: Present: alert, oriented X3





Course





                                   Vital Signs











  05/04/23





  14:25


 


Temperature 98.1 F


 


Pulse Rate 128 H


 


Respiratory 22





Rate 


 


Blood Pressure 124/77


 


O2 Sat by Pulse 98





Oximetry 














Medical Decision Making





- Medical Decision Making


Was pt. sent in by a medical professional or institution (, PA, NP, urgent 

care, hospital, or nursing home...) When possible be specific


@  -No


Did you speak to anyone other than the patient for history (EMS, parent, family,

 police, friend...)? What history was obtained from this source 


@  -EMS who arrived on scene and gave patient 2 mg of Narcan.


Did you review nursing and triage notes (agree or disagree)?  Why? 


@  -I reviewed and agree with nursing and triage notes


Were old charts reviewed (outside hosp., previous admission, EMS record, old 

EKG, old radiological studies, urgent care reports/EKG's, nursing home records)?

 Report findings 


@  -No old charts were reviewed


Differential Diagnosis (chest pain, altered mental status, abdominal pain women,

 abdominal pain men, vaginal bleeding, weakness, fever, dyspnea, syncope, 

headache, dizziness, GI bleed, back pain, seizure, CVA, palpatations, mental 

health, musculoskeletal)? 


@  -Heroin overdose, drug overdose, heroin abuse


EKG interpreted by me (3pts min.).


@  -None


X-rays interpreted by me (1pt min.).


@  -None done


CT interpreted by me (1pt min.).


@  -None done


U/S interpreted by me (1pt. min.).


@  -None done


What testing was considered but not performed or refused? (CT, X-rays, U/S, 

labs)? Why?


@  -None


What meds were considered but not given or refused? Why?


@  -None


Did you discuss the management of the patient with other professionals 

(professionals i.e. , PA, NP, lab, RT, psych nurse, , , 

teacher, , )? Give summary


@  -No


Was smoking cessation discussed for >3mins.?


@  -No


Was critical care preformed (if so, how long)?


@  -No


Were there social determinants of health that impacted care today? How? 

(Homelessness, low income, unemployed, alcoholism, drug addiction, 

transportation, low edu. Level, literacy, decrease access to med. care, nursing home, 

rehab)?


@  -No


Was there de-escalation of care discussed even if they declined (Discuss DNR or 

withdrawal of care, Hospice)? DNR status


@  -No


What co-morbidities impacted this encounter? (DM, HTN, Smoking, COPD, CAD, 

Cancer, CVA, ARF, Chemo, Hep., AIDS, mental health diagnosis, sleep apnea, 

morbid obesity)?


@  -Drug abuse


Was patient admitted / discharged? Hospital course, mention meds given and 

route, prescriptions, significant lab abnormalities, going to OR and other 

pertinent info.


@  -Discharge patient was observed after receiving Narcan.  Patient was 

discharged in stable condition this was not intentional. 


Undiagnosed new problem with uncertain prognosis?


@  -No


Drug Therapy requiring intensive monitoring for toxicity (Heparin, Nitro, 

Insulin, Cardizem)?


@  -No


Were any procedures done?


@  -No


Diagnosis/symptom?


@  -Accidental heroin overdose


Acute, or Chronic, or Acute on Chronic?


@  -Acute


Uncomplicated (without systemic symptoms) or Complicated (systemic symptoms)?


@  -Uncomplicated


Side effects of treatment?


@  -No


Exacerbation, Progression, or Severe Exacerbation?


@  -No


Poses a threat to life or bodily function? How? (Chest pain, USA, MI, pneumonia,

 PE, COPD, DKA, ARF, appy, cholecystitis, CVA, Diverticulitis, Homicidal, 

Suicidal, threat to staff... and all critical care pts)


@  -No








Disposition


Clinical Impression: 


 Accidental heroin overdose





Disposition: HOME SELF-CARE


Condition: Stable


Instructions (If sedation given, give patient instructions):  Adult Overdose 

(ED)


Additional Instructions: 


Please return to the Emergency Department if symptoms worsen or any other 

concerns.


Is patient prescribed a controlled substance at d/c from ED?: No


Referrals: 


ELVIE Rodriguez III, MD [Primary Care Provider] - 1-2 days


Time of Disposition: 14:59